# Patient Record
Sex: FEMALE | Race: WHITE | URBAN - METROPOLITAN AREA
[De-identification: names, ages, dates, MRNs, and addresses within clinical notes are randomized per-mention and may not be internally consistent; named-entity substitution may affect disease eponyms.]

---

## 2017-05-08 ENCOUNTER — TELEPHONE (OUTPATIENT)
Dept: CARDIOLOGY CLINIC | Age: 82
End: 2017-05-08

## 2017-05-08 DIAGNOSIS — I42.9 SECONDARY CARDIOMYOPATHY, UNSPECIFIED: Primary | ICD-10-CM

## 2017-05-23 ENCOUNTER — OFFICE VISIT (OUTPATIENT)
Dept: CARDIOLOGY CLINIC | Age: 82
End: 2017-05-23

## 2017-05-23 VITALS
DIASTOLIC BLOOD PRESSURE: 62 MMHG | WEIGHT: 122 LBS | BODY MASS INDEX: 20.33 KG/M2 | HEIGHT: 65 IN | SYSTOLIC BLOOD PRESSURE: 122 MMHG | HEART RATE: 60 BPM

## 2017-05-23 DIAGNOSIS — I25.10 ATHEROSCLEROSIS OF NATIVE CORONARY ARTERY OF NATIVE HEART WITHOUT ANGINA PECTORIS: ICD-10-CM

## 2017-05-23 DIAGNOSIS — I10 ESSENTIAL HYPERTENSION, BENIGN: ICD-10-CM

## 2017-05-23 DIAGNOSIS — I42.9 CARDIOMYOPATHY (HCC): ICD-10-CM

## 2017-05-23 DIAGNOSIS — I48.0 PAROXYSMAL ATRIAL FIBRILLATION (HCC): ICD-10-CM

## 2017-05-23 DIAGNOSIS — I35.1 AORTIC VALVE INSUFFICIENCY, UNSPECIFIED ETIOLOGY: Primary | ICD-10-CM

## 2017-05-23 DIAGNOSIS — E78.5 HYPERLIPIDEMIA, UNSPECIFIED HYPERLIPIDEMIA TYPE: ICD-10-CM

## 2017-05-23 PROCEDURE — 99214 OFFICE O/P EST MOD 30 MIN: CPT | Performed by: INTERNAL MEDICINE

## 2017-05-23 RX ORDER — TORSEMIDE 20 MG/1
10 TABLET ORAL DAILY
Qty: 30 TABLET | Refills: 6 | Status: SHIPPED | OUTPATIENT
Start: 2017-05-23 | End: 2018-04-19 | Stop reason: SDUPTHER

## 2017-05-23 RX ORDER — WARFARIN SODIUM 2 MG/1
TABLET ORAL
COMMUNITY
Start: 2017-03-02 | End: 2019-04-26 | Stop reason: SDUPTHER

## 2017-07-21 ENCOUNTER — INPATIENT (INPATIENT)
Facility: HOSPITAL | Age: 82
LOS: 4 days | Discharge: NURSING, REHAB OR LONG-TERM CARE | End: 2017-07-26
Payer: COMMERCIAL

## 2017-07-21 PROCEDURE — 73502 X-RAY EXAM HIP UNI 2-3 VIEWS: CPT | Mod: 26,RT

## 2017-07-21 PROCEDURE — 72170 X-RAY EXAM OF PELVIS: CPT | Mod: 26

## 2017-07-21 PROCEDURE — 99284 EMERGENCY DEPT VISIT MOD MDM: CPT

## 2017-07-21 PROCEDURE — 72192 CT PELVIS W/O DYE: CPT | Mod: 26

## 2017-07-23 PROCEDURE — 73130 X-RAY EXAM OF HAND: CPT | Mod: 26,LT

## 2017-10-25 ENCOUNTER — OFFICE VISIT (OUTPATIENT)
Dept: CARDIOLOGY CLINIC | Age: 82
End: 2017-10-25

## 2017-10-25 VITALS
SYSTOLIC BLOOD PRESSURE: 136 MMHG | DIASTOLIC BLOOD PRESSURE: 80 MMHG | HEART RATE: 60 BPM | HEIGHT: 65 IN | WEIGHT: 123.8 LBS | BODY MASS INDEX: 20.62 KG/M2

## 2017-10-25 DIAGNOSIS — E78.5 HYPERLIPIDEMIA, UNSPECIFIED HYPERLIPIDEMIA TYPE: ICD-10-CM

## 2017-10-25 DIAGNOSIS — Z87.74 S/P VSD REPAIR: ICD-10-CM

## 2017-10-25 DIAGNOSIS — I25.5 ISCHEMIC CARDIOMYOPATHY: ICD-10-CM

## 2017-10-25 DIAGNOSIS — I48.0 PAROXYSMAL ATRIAL FIBRILLATION (HCC): ICD-10-CM

## 2017-10-25 DIAGNOSIS — I10 ESSENTIAL HYPERTENSION, BENIGN: Primary | ICD-10-CM

## 2017-10-25 DIAGNOSIS — I35.1 AORTIC VALVE INSUFFICIENCY, ETIOLOGY OF CARDIAC VALVE DISEASE UNSPECIFIED: ICD-10-CM

## 2017-10-25 PROCEDURE — 99213 OFFICE O/P EST LOW 20 MIN: CPT | Performed by: INTERNAL MEDICINE

## 2017-10-25 RX ORDER — WARFARIN SODIUM 2 MG/1
2 TABLET ORAL DAILY
COMMUNITY

## 2017-10-25 NOTE — PROGRESS NOTES
Cardiac Follow Up    Referring Provider:  Augustine Sutherland MD     Chief Complaint   Patient presents with    6 Month Follow-Up    Atrial Fibrillation    Coronary Artery Disease    Hypertension      History of Present Illness:  Ms. Arthur Muhammad is an 80 y.o. female here today in follow up. 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 In March 2007, she became very weak and short of breath. Repeat angiography revealed patent grafts. A stress test in October 2010 a revealed a fixed defect and normal ejection fraction. Previously had bleeding on xarelto, now on coumadin. Today, Johnny Mahmood tells me she is doing well. She denies chest pain, palpitations, STOKES, dizziness, or edema. No problem with bleeding. She has had a stable INR. She has no chest pain, shortness of breath palpitations or dizziness. Past Medical History:   has a past medical history of AF (atrial fibrillation) ([address] CAD (coronary artery disease); Hyperlipidemia; Hypertension; and Valvular heart disease. Surgical History:   has a past surgical history that includes Coronary artery bypass graft and Diagnostic Cardiac Cath Lab Procedure. Social History:   reports that she has quit smoking. She has never used smokeless tobacco. She reports that she drinks alcohol. She reports that she does not use drugs. Family History:  family history includes Cancer in her father and sister; Other in her mother.      Current Outpatient Prescriptions   Medication Sig Dispense Refill    warfarin (COUMADIN) 2 MG tablet Take 2 mg by mouth daily      torsemide (DEMADEX) 20 MG tablet Take 0.5 tablets by mouth daily (Patient taking differently: Take 20 mg by mouth daily ) 30 tablet 6    metoprolol (TOPROL XL) 25 MG XL tablet Take 1 tablet by mouth daily 30 tablet 3    irbesartan (AVAPRO) 150 MG tablet Take Appearance: Well-nourished, appears younger than her stated age, in no acute distress   Respiratory:  · Normal excursion and expansion without use of accessory muscles  · Resp Auscultation: Normal breath sounds without dullness  Cardiovascular:  · The apical impulses not displaced  · Heart tones are crisp and normal  · Cervical veins are not engorged  · The carotid upstroke is normal in amplitude and contour without delay or bruit  · Well-healed median sternotomy incision  · S4 gallop unchanged  · II/VI pansystolic murmur at the apex to the axilla unchanged  · Peripheral pulses are symmetrical and full  · There is no clubbing, cyanosis of the extremities. · No edema  · Femoral Arteries: 2+ and equal  · Pedal Pulses: 2+ and equal   · Significant vein presence. Abdomen:  · No masses or tenderness  · Liver/Spleen: No Abnormalities Noted  Neurological/Psychiatric:  · Alert and oriented in all spheres  · Moves all extremities well  · Exhibits normal gait balance and coordination  · No abnormalities of mood, affect, memory, mentation, or behavior are noted      Assessment:   Problem: Coronary artery disease and left ventricular dysfunction  October, 1988: Repair of ventricular septal rupture. Saphenous vein bypass graft to the intermediate and obtuse marginal branch and the posterolateral branch of the circumflex. November 2011: Adenosine Myoview study no evidence of myocardial ischemia. Fixed apical defect. Overall left ventricular function was within normal limits. Problem: CHF  Echo 5/2014: EF 45-50%  MUGA 12/2015: EF 47%  E[place] (in [place]) 3/2016: EF 50%, LAE, WALDEMAR, mild AI, mild-mod TR    Problem: Hypertension    /80 (Site: Left Arm, Position: Sitting, Cuff Size: Small Adult)   Pulse 60 Comment: irregular  Ht 5' 5\" (1.651 m)   Wt 123 lb 12.8 oz (56.2 kg)   BMI 20.60 kg/m²   Stable BP. Problem: Hyperlipidemia  10/13/16: ; TRIG 64; HDL 58; LDL 35  Favorable control on current statin.       Problem: Dyspnea on exertion    5/2014 ECHO EF 45-50% mild MVP, Mild to mod TR; mild to mod AI     11/2015 ECHO: EF 35-40%, severely dilated RA, LA and RV, mild-mod AR, trace MR, mild TR; recent  (no edema on her LE)  4/17-- EF 50%    Problem: renal insufficiency  Creat 1.28, we decreased demadex to 10 mg daily--instructed to call for shortness of breath    Problem: Atrial fibrillation  Remains on coumadin  Event monitor 5/2016: Min HR 37, Max , Avg 62 BPM when in Afib. 80% of the time in Afib. Many short, asymptomatic runs of NSVT. Also 2-2.5 second pauses    Plan:  Same medications. Follow up in     Guillaume Weeks M.D., [place]    NOTE:  This report was transcribed using voice recognition software. Every effort was made to ensure accuracy; however, inadvertent computerized transcription errors may be present.

## 2018-04-19 ENCOUNTER — OFFICE VISIT (OUTPATIENT)
Dept: CARDIOLOGY CLINIC | Age: 83
End: 2018-04-19

## 2018-04-19 ENCOUNTER — TELEPHONE (OUTPATIENT)
Dept: CARDIOLOGY CLINIC | Age: 83
End: 2018-04-19

## 2018-04-19 VITALS
HEIGHT: 65 IN | SYSTOLIC BLOOD PRESSURE: 130 MMHG | DIASTOLIC BLOOD PRESSURE: 64 MMHG | HEART RATE: 52 BPM | BODY MASS INDEX: 19.99 KG/M2 | WEIGHT: 120 LBS

## 2018-04-19 DIAGNOSIS — I25.10 ATHEROSCLEROSIS OF NATIVE CORONARY ARTERY OF NATIVE HEART WITHOUT ANGINA PECTORIS: ICD-10-CM

## 2018-04-19 DIAGNOSIS — I10 ESSENTIAL HYPERTENSION, BENIGN: ICD-10-CM

## 2018-04-19 DIAGNOSIS — E78.5 HYPERLIPIDEMIA, UNSPECIFIED HYPERLIPIDEMIA TYPE: ICD-10-CM

## 2018-04-19 DIAGNOSIS — I48.0 PAROXYSMAL ATRIAL FIBRILLATION (HCC): Primary | ICD-10-CM

## 2018-04-19 PROCEDURE — 93000 ELECTROCARDIOGRAM COMPLETE: CPT | Performed by: INTERNAL MEDICINE

## 2018-04-19 PROCEDURE — 99214 OFFICE O/P EST MOD 30 MIN: CPT | Performed by: INTERNAL MEDICINE

## 2018-04-19 RX ORDER — TORSEMIDE 20 MG/1
20 TABLET ORAL DAILY
Qty: 90 TABLET | Refills: 3 | Status: SHIPPED | OUTPATIENT
Start: 2018-04-19 | End: 2018-04-23 | Stop reason: SDUPTHER

## 2018-04-19 RX ORDER — MULTIVITAMIN WITH IRON
100 TABLET ORAL DAILY
COMMUNITY
End: 2019-04-26

## 2018-04-20 ENCOUNTER — TELEPHONE (OUTPATIENT)
Dept: CARDIOLOGY CLINIC | Age: 83
End: 2018-04-20

## 2018-04-20 DIAGNOSIS — E78.5 HYPERLIPIDEMIA, UNSPECIFIED HYPERLIPIDEMIA TYPE: Primary | ICD-10-CM

## 2018-04-20 DIAGNOSIS — I10 ESSENTIAL HYPERTENSION, BENIGN: ICD-10-CM

## 2018-04-20 LAB
ALBUMIN SERPL-MCNC: 3.8 G/DL (ref 3.5–5)
ALP BLD-CCNC: 131 IU/L (ref 35–104)
ALT SERPL-CCNC: 23 IU/L (ref 10–35)
ANION GAP SERPL CALCULATED.3IONS-SCNC: 15 MMOL/L (ref 6–18)
AST SERPL-CCNC: 129 IU/L (ref 10–35)
BILIRUB SERPL-MCNC: 0.8 MG/DL (ref 0–1)
BUN BLDV-MCNC: 33 MG/DL (ref 8–26)
CALCIUM SERPL-MCNC: 9.4 MG/DL (ref 8.8–10.1)
CHLORIDE BLD-SCNC: 103 MEQ/L (ref 101–111)
CHOLESTEROL, TOTAL: 149 MG/DL
CHOLESTEROL/HDL RATIO: 2.4 (ref 1.9–4.2)
CO2: 28 MEQ/L (ref 22–29)
CREAT SERPL-MCNC: 1.05 MG/DL (ref 0.44–1.03)
GFR AFRICAN AMERICAN: >60 ML/MIN/1.73 SQ METER
GFR NON-AFRICAN AMERICAN: 50 ML/MIN/1.73 SQ METER
GLUCOSE BLD-MCNC: 104 MG/DL (ref 70–99)
HDLC SERPL-MCNC: 63 MG/DL
LDL CHOLESTEROL CALCULATED: 71 MG/DL
LDL/HDL RATIO: 1.1 (ref 1–4)
POTASSIUM SERPL-SCNC: 3.8 MEQ/L (ref 3.6–5.1)
SODIUM BLD-SCNC: 142 MEQ/L (ref 135–145)
TOTAL PROTEIN: 7.3 G/DL (ref 6.6–8.7)
TRIGL SERPL-MCNC: 76 MG/DL

## 2018-04-23 RX ORDER — TORSEMIDE 10 MG/1
10 TABLET ORAL DAILY
Qty: 90 TABLET | Refills: 3
Start: 2018-04-23 | End: 2019-05-01 | Stop reason: SDUPTHER

## 2018-05-02 ENCOUNTER — TELEPHONE (OUTPATIENT)
Dept: CARDIOLOGY CLINIC | Age: 83
End: 2018-05-02

## 2018-05-16 ENCOUNTER — TELEPHONE (OUTPATIENT)
Dept: CARDIOLOGY CLINIC | Age: 83
End: 2018-05-16

## 2018-05-21 ENCOUNTER — TELEPHONE (OUTPATIENT)
Dept: CARDIOLOGY CLINIC | Age: 83
End: 2018-05-21

## 2018-05-24 ENCOUNTER — TELEPHONE (OUTPATIENT)
Dept: CARDIOLOGY CLINIC | Age: 83
End: 2018-05-24

## 2018-05-25 ENCOUNTER — TELEPHONE (OUTPATIENT)
Dept: CARDIOLOGY CLINIC | Age: 83
End: 2018-05-25

## 2018-05-25 ENCOUNTER — OFFICE VISIT (OUTPATIENT)
Dept: CARDIOLOGY CLINIC | Age: 83
End: 2018-05-25

## 2018-05-25 VITALS
WEIGHT: 121 LBS | DIASTOLIC BLOOD PRESSURE: 62 MMHG | BODY MASS INDEX: 20.16 KG/M2 | HEIGHT: 65 IN | HEART RATE: 54 BPM | SYSTOLIC BLOOD PRESSURE: 124 MMHG | OXYGEN SATURATION: 98 %

## 2018-05-25 DIAGNOSIS — I25.10 ATHEROSCLEROSIS OF NATIVE CORONARY ARTERY OF NATIVE HEART WITHOUT ANGINA PECTORIS: Primary | ICD-10-CM

## 2018-05-25 DIAGNOSIS — Z01.818 PREOPERATIVE CLEARANCE: ICD-10-CM

## 2018-05-25 PROCEDURE — 99214 OFFICE O/P EST MOD 30 MIN: CPT | Performed by: INTERNAL MEDICINE

## 2018-06-24 PROBLEM — Z01.818 PREOPERATIVE CLEARANCE: Status: RESOLVED | Noted: 2018-05-25 | Resolved: 2018-06-24

## 2018-10-05 ENCOUNTER — OFFICE VISIT (OUTPATIENT)
Dept: CARDIOLOGY CLINIC | Age: 83
End: 2018-10-05
Payer: MEDICARE

## 2018-10-05 VITALS
HEART RATE: 56 BPM | BODY MASS INDEX: 20.14 KG/M2 | HEIGHT: 65 IN | SYSTOLIC BLOOD PRESSURE: 130 MMHG | DIASTOLIC BLOOD PRESSURE: 72 MMHG

## 2018-10-05 DIAGNOSIS — R74.8 ELEVATED LIVER ENZYMES: ICD-10-CM

## 2018-10-05 DIAGNOSIS — Z87.74 S/P VSD REPAIR: ICD-10-CM

## 2018-10-05 DIAGNOSIS — R06.02 SOB (SHORTNESS OF BREATH): Primary | ICD-10-CM

## 2018-10-05 DIAGNOSIS — R60.0 LOCALIZED EDEMA: ICD-10-CM

## 2018-10-05 DIAGNOSIS — I10 ESSENTIAL HYPERTENSION, BENIGN: ICD-10-CM

## 2018-10-05 PROCEDURE — 93000 ELECTROCARDIOGRAM COMPLETE: CPT | Performed by: NURSE PRACTITIONER

## 2018-10-05 PROCEDURE — 99214 OFFICE O/P EST MOD 30 MIN: CPT | Performed by: NURSE PRACTITIONER

## 2018-10-05 RX ORDER — PETROLATUM,WHITE/LANOLIN
OINTMENT (GRAM) TOPICAL 2 TIMES DAILY
COMMUNITY
End: 2020-11-13

## 2018-10-05 NOTE — LETTER
PVC's, Dr Flores Number regulates, checks INR at home. 2. Atherosclerosis of native coronary artery of native heart without angina pectoris -stable  10/88: repair of ventricular septal rupture. CABG w/ SVG-intermediate and OM branch and the posterolateral branch of the Cx, LAD is occluded. 3. Essential hypertension, benign -stable   4. Hyperlipidemia, unspecified hyperlipidemia type -Lipitor 10mg daily, follows with pcp     5. EDEMA- check labs to see if could take exta 1/2 of demedex when swelling worse, compression hose  6. Elevated liver enzymes in the past   4/18  ,  ALT 23   ALk phosphatase 131      Check CMP and lipids. Encouraged to get regular exercise. Continue compression hose. Has FU and wants to keep OV with DR. Kay    Thank you for allowing to me to participate in the care of Yusuf Solis. If you have questions, please do not hesitate to call me. I look forward to following James Baldwin along with you.     Sincerely,        ANDREA Ramos Cha - CNP

## 2018-10-19 LAB
ALBUMIN SERPL-MCNC: 3.8 G/DL (ref 3.5–5)
ALP BLD-CCNC: 111 IU/L (ref 35–104)
ALT SERPL-CCNC: 13 IU/L (ref 10–35)
ANION GAP SERPL CALCULATED.3IONS-SCNC: 14 MMOL/L (ref 6–18)
AST SERPL-CCNC: 67 IU/L (ref 10–35)
B-TYPE NATRIURETIC PEPTIDE: 552 PG/ML (ref 0–95)
BILIRUB SERPL-MCNC: 1.2 MG/DL (ref 0–1)
BILIRUBIN DIRECT: 0.4 MG/DL (ref 0–0.2)
BUN BLDV-MCNC: 29 MG/DL (ref 8–26)
CALCIUM SERPL-MCNC: 10 MG/DL (ref 8.8–10.1)
CHLORIDE BLD-SCNC: 99 MEQ/L (ref 101–111)
CO2: 30 MMOL/L (ref 22–29)
CREAT SERPL-MCNC: 1.18 MG/DL (ref 0.44–1.03)
GFR AFRICAN AMERICAN: 47 ML/MIN/1.73 M2
GFR NON-AFRICAN AMERICAN: 40 ML/MIN/1.73 M2
GLUCOSE BLD-MCNC: 93 MG/DL (ref 70–99)
POTASSIUM SERPL-SCNC: 4 MEQ/L (ref 3.6–5.1)
SODIUM BLD-SCNC: 139 MEQ/L (ref 135–145)
TOTAL PROTEIN: 7.2 G/DL (ref 6.6–8.7)

## 2018-10-25 ENCOUNTER — TELEPHONE (OUTPATIENT)
Dept: CARDIOLOGY CLINIC | Age: 83
End: 2018-10-25

## 2018-10-25 ENCOUNTER — OFFICE VISIT (OUTPATIENT)
Dept: CARDIOLOGY CLINIC | Age: 83
End: 2018-10-25
Payer: MEDICARE

## 2018-10-25 VITALS
WEIGHT: 129 LBS | DIASTOLIC BLOOD PRESSURE: 70 MMHG | HEART RATE: 58 BPM | HEIGHT: 65 IN | BODY MASS INDEX: 21.49 KG/M2 | SYSTOLIC BLOOD PRESSURE: 140 MMHG

## 2018-10-25 DIAGNOSIS — I25.5 ISCHEMIC CARDIOMYOPATHY: ICD-10-CM

## 2018-10-25 DIAGNOSIS — I10 ESSENTIAL HYPERTENSION, BENIGN: ICD-10-CM

## 2018-10-25 DIAGNOSIS — E78.5 HYPERLIPIDEMIA, UNSPECIFIED HYPERLIPIDEMIA TYPE: ICD-10-CM

## 2018-10-25 DIAGNOSIS — I25.10 ATHEROSCLEROSIS OF NATIVE CORONARY ARTERY OF NATIVE HEART WITHOUT ANGINA PECTORIS: Primary | ICD-10-CM

## 2018-10-25 PROCEDURE — 99214 OFFICE O/P EST MOD 30 MIN: CPT | Performed by: INTERNAL MEDICINE

## 2018-10-25 NOTE — PROGRESS NOTES
[address]   Cardiac Evaluation      Patient: Dwain Larose  YOB: 1931  Date: 10/25/18       Chief Complaint   Patient presents with    Hypertension    Hyperlipidemia    Atrial Fibrillation        Referring provider: Avani Rapp MD    History of Present Illness:   Mrs Chriss Webb is seen today for follow up. Saw CRNP 10/5/18 for edema. History includes CAD w/ CABG and repair of ventricular septal aneurysm and rupture in 1988. She had repeat LHC in 2007 that revealed patent grafts. She is on coumadin for atrial fibrillation. Today, Mrs Chriss Webb states she has had edema in her LE's. She wears compression hose. Has varicose veins. Beba Acuna denies any chest pain, palpitations, dizziness. She is here with her  today. She does not exercise anymore because walking is difficult. Past Medical History:   has a past medical history of AF (atrial fibrillation) ([facility] [address] CAD (coronary artery disease); Hyperlipidemia; Hypertension; and Valvular heart disease. Surgical History:   has a past surgical history that includes Coronary artery bypass graft and Diagnostic Cardiac Cath Lab Procedure. Current Outpatient Prescriptions   Medication Sig Dispense Refill    Misc Natural Products (GLUCOS-CHONDROIT-MSM COMPLEX) TABS Take by mouth 2 times daily      torsemide (DEMADEX) 10 MG tablet Take 1 tablet by mouth daily 90 tablet 3    vitamin B-6 (PYRIDOXINE) 100 MG tablet Take 100 mg by mouth daily      warfarin (COUMADIN) 2 MG tablet Take 2 mg by mouth daily      COUMADIN 2 MG tablet       metoprolol (TOPROL XL) 25 MG XL tablet Take 1 tablet by mouth daily 30 tablet 3    irbesartan (AVAPRO) 150 MG tablet Take 1 tablet by mouth nightly 30 tablet 3    oxybutynin (DITROPAN) 5 MG tablet Take 5 mg by mouth daily.  co-enzyme Q-10 (COQ10) 30 MG capsule Take 200 mg by mouth daily       levothyroxine (LEVOTHROID) 75 MCG tablet Take 75 mcg by mouth daily.         vitamin B-12 1018   BP: (!) 140/70 (!) 140/70   Site: Left Upper Arm    Position: Sitting    Cuff Size: Medium Adult    Pulse: 58    Weight: 129 lb (58.5 kg)    Height: 5' 5\" (1.651 m)      Body mass index is 21.47 kg/m². Wt Readings from Last 3 Encounters:   10/25/18 129 lb (58.5 kg)   05/25/18 121 lb (54.9 kg)   04/19/18 120 lb (54.4 kg)      BP Readings from Last 3 Encounters:   10/25/18 (!) 140/70   10/04/18 130/72   05/25/18 124/62      Constitutional and General Appearance:  appears stated age, frail  Eyes - no xanthelasma  Respiratory:  · Normal excursion and expansion without use of accessory muscles  · Resp Auscultation: Normal breath sounds without dullness  Cardiovascular:  · The apical impulses not displaced  · Heart is irregular rate and rhythm with normal S1, S2   · PMI is normal  · The carotid upstroke is normal, no bruit noted   · JVP is not elevated  · Peripheral pulses are symmetrical  · There is no clubbing, cyanosis of the extremities  · No edema; severe varicose veins and hyperpigmentation of the lower legs  · Femoral Arteries: 2+ and equal without bruits  · Pedal Pulses: 2+ and equal   Abdomen:  · No masses or tenderness  · Aorta not palpable  · Normal bowel sounds  Neurological/Psychiatric:  · Alert and oriented x3  · Moves all extremities well  · Exhibits normal gait balance and coordination      Assessment/Plan  1. Paroxysmal atrial fibrillation -EKG> atrial fibrillation with CVR RBBB, PVC's, Dr Danny Franco regulates, checks INR at home. HR 90bpm after walking my hallway; she did not appear to have any respiratory distress with walking. 2. Atherosclerosis of native coronary artery of native heart without angina pectoris -stable  10/88: repair of ventricular septal rupture. CABG w/ SVG-intermediate and OM branch and the posterolateral branch of the Cx, LAD is occluded. 3. Essential hypertension, benign -stable   4.  Hyperlipidemia, unspecified hyperlipidemia type -Lipitor 10mg daily, follows with pcp

## 2018-12-05 ENCOUNTER — OFFICE VISIT (OUTPATIENT)
Dept: CARDIOLOGY CLINIC | Age: 83
End: 2018-12-05
Payer: MEDICARE

## 2018-12-05 VITALS
HEIGHT: 65 IN | SYSTOLIC BLOOD PRESSURE: 112 MMHG | WEIGHT: 133 LBS | BODY MASS INDEX: 22.16 KG/M2 | DIASTOLIC BLOOD PRESSURE: 60 MMHG | HEART RATE: 54 BPM

## 2018-12-05 DIAGNOSIS — E78.5 HYPERLIPIDEMIA, UNSPECIFIED HYPERLIPIDEMIA TYPE: ICD-10-CM

## 2018-12-05 DIAGNOSIS — I25.10 ATHEROSCLEROSIS OF NATIVE CORONARY ARTERY OF NATIVE HEART WITHOUT ANGINA PECTORIS: ICD-10-CM

## 2018-12-05 DIAGNOSIS — I10 ESSENTIAL HYPERTENSION, BENIGN: ICD-10-CM

## 2018-12-05 DIAGNOSIS — R60.0 LOCALIZED EDEMA: ICD-10-CM

## 2018-12-05 DIAGNOSIS — R06.02 SOB (SHORTNESS OF BREATH): Primary | ICD-10-CM

## 2018-12-05 PROCEDURE — 99214 OFFICE O/P EST MOD 30 MIN: CPT | Performed by: NURSE PRACTITIONER

## 2018-12-05 RX ORDER — SPIRONOLACTONE 25 MG/1
12.5 TABLET ORAL DAILY
Qty: 15 TABLET | Refills: 5 | Status: SHIPPED | OUTPATIENT
Start: 2018-12-05 | End: 2018-12-20 | Stop reason: SDUPTHER

## 2018-12-05 NOTE — PROGRESS NOTES
tablet by mouth daily 30 tablet 3    oxybutynin (DITROPAN) 5 MG tablet Take 5 mg by mouth daily.  co-enzyme Q-10 (COQ10) 30 MG capsule Take 200 mg by mouth daily       levothyroxine (LEVOTHROID) 75 MCG tablet Take 75 mcg by mouth daily.  vitamin B-12 (CYANOCOBALAMIN) 500 MCG tablet Take 1,000 mcg by mouth daily       folic acid (FOLVITE) 1 MG tablet Take 1 mg by mouth daily.  vitamin B-6 (PYRIDOXINE) 100 MG tablet Take 100 mg by mouth daily       No current facility-administered medications for this visit. Social History:  Social History     Social History    Marital status:      Spouse name: N/A    Number of children: N/A    Years of education: N/A     Occupational History    Not on file. Social History Main Topics    Smoking status: Former Smoker    Smokeless tobacco: Never Used      Comment: quit 20 years    Alcohol use Yes      Comment: minimal/1 glass of wine each night    Drug use: No    Sexual activity: Not on file     Other Topics Concern    Not on file     Social History Narrative    No narrative on file       Family History:  family history includes Cancer in her father and sister; Other in her mother. Allergies:  Levaquin [levofloxacin in d5w]     Review of Systems:   · Constitutional: there has been no unanticipated weight loss. No change in energy or activity level   · Eyes: No visual changes   · ENT: No Headaches, hearing loss or vertigo. No mouth sores or sore throat. · Cardiovascular: Reviewed in HPI  · Respiratory: No cough or wheezing, no sputum production. · Gastrointestinal: No abdominal pain, appetite loss, blood in stools. No change in bowel or bladder habits. · Genitourinary: No nocturia, dysuria, trouble voiding  · Musculoskeletal:  No gait disturbance, weakness or joint complaints. · Integumentary: No rash or pruritis. · Neurological: No headache, change in muscle strength, numbness or tingling.  No change in gait, balance,

## 2018-12-11 ENCOUNTER — PROCEDURE VISIT (OUTPATIENT)
Dept: CARDIOLOGY CLINIC | Age: 83
End: 2018-12-11

## 2018-12-11 DIAGNOSIS — R06.02 SOB (SHORTNESS OF BREATH): ICD-10-CM

## 2018-12-11 DIAGNOSIS — R60.0 LOCALIZED EDEMA: Primary | ICD-10-CM

## 2018-12-11 LAB
LV EF: 43 %
LVEF MODALITY: NORMAL

## 2018-12-12 ENCOUNTER — TELEPHONE (OUTPATIENT)
Dept: CARDIOLOGY CLINIC | Age: 83
End: 2018-12-12

## 2018-12-12 NOTE — TELEPHONE ENCOUNTER
Pt  calling would like a copy of the Echo done 12/11/18 at [facility]. Will  there in [facility] when ready.  Pls call to advise Thank you

## 2018-12-13 LAB
ANION GAP SERPL CALCULATED.3IONS-SCNC: 12 MMOL/L (ref 6–18)
B-TYPE NATRIURETIC PEPTIDE: 531 PG/ML (ref 0–95)
BUN BLDV-MCNC: 34 MG/DL (ref 8–26)
CALCIUM SERPL-MCNC: 9.1 MG/DL (ref 8.8–10.1)
CHLORIDE BLD-SCNC: 99 MEQ/L (ref 101–111)
CO2: 32 MMOL/L (ref 22–29)
CREAT SERPL-MCNC: 1.35 MG/DL (ref 0.44–1.03)
GFR AFRICAN AMERICAN: 40 ML/MIN/1.73 M2
GFR NON-AFRICAN AMERICAN: 34 ML/MIN/1.73 M2
GLUCOSE BLD-MCNC: 79 MG/DL (ref 70–99)
POTASSIUM SERPL-SCNC: 4.1 MEQ/L (ref 3.6–5.1)
SODIUM BLD-SCNC: 139 MEQ/L (ref 135–145)

## 2018-12-14 ENCOUNTER — TELEPHONE (OUTPATIENT)
Dept: CARDIOLOGY CLINIC | Age: 83
End: 2018-12-14

## 2018-12-20 ENCOUNTER — OFFICE VISIT (OUTPATIENT)
Dept: CARDIOLOGY CLINIC | Age: 83
End: 2018-12-20
Payer: MEDICARE

## 2018-12-20 VITALS
HEART RATE: 83 BPM | WEIGHT: 122 LBS | HEIGHT: 65 IN | SYSTOLIC BLOOD PRESSURE: 118 MMHG | DIASTOLIC BLOOD PRESSURE: 64 MMHG | BODY MASS INDEX: 20.33 KG/M2 | OXYGEN SATURATION: 93 %

## 2018-12-20 DIAGNOSIS — I48.0 PAROXYSMAL ATRIAL FIBRILLATION (HCC): ICD-10-CM

## 2018-12-20 DIAGNOSIS — I10 ESSENTIAL HYPERTENSION, BENIGN: Primary | ICD-10-CM

## 2018-12-20 DIAGNOSIS — R60.0 LOCALIZED EDEMA: ICD-10-CM

## 2018-12-20 DIAGNOSIS — I25.5 ISCHEMIC CARDIOMYOPATHY: ICD-10-CM

## 2018-12-20 DIAGNOSIS — E78.49 OTHER HYPERLIPIDEMIA: ICD-10-CM

## 2018-12-20 PROCEDURE — 99214 OFFICE O/P EST MOD 30 MIN: CPT | Performed by: INTERNAL MEDICINE

## 2018-12-20 RX ORDER — SPIRONOLACTONE 25 MG/1
12.5 TABLET ORAL DAILY
Qty: 45 TABLET | Refills: 3 | Status: SHIPPED | OUTPATIENT
Start: 2018-12-20 | End: 2019-10-04 | Stop reason: SDUPTHER

## 2018-12-20 NOTE — PROGRESS NOTES
tablet by mouth daily 30 tablet 3    oxybutynin (DITROPAN) 5 MG tablet Take 5 mg by mouth daily.  co-enzyme Q-10 (COQ10) 30 MG capsule Take 200 mg by mouth daily       levothyroxine (LEVOTHROID) 75 MCG tablet Take 75 mcg by mouth daily.  vitamin B-12 (CYANOCOBALAMIN) 500 MCG tablet Take 1,000 mcg by mouth daily       folic acid (FOLVITE) 1 MG tablet Take 1 mg by mouth daily. No current facility-administered medications for this visit. Social History:  Social History     Social History    Marital status:      Spouse name: N/A    Number of children: N/A    Years of education: N/A     Occupational History    Not on file. Social History Main Topics    Smoking status: Former Smoker    Smokeless tobacco: Never Used      Comment: quit 20 years    Alcohol use Yes      Comment: minimal/1 glass of wine each night    Drug use: No    Sexual activity: Not on file     Other Topics Concern    Not on file     Social History Narrative    No narrative on file       Family History:  family history includes Cancer in her father and sister; Other in her mother. Allergies:  Levaquin [levofloxacin in d5w]     Review of Systems:   · Constitutional: there has been no unanticipated weight loss. No change in energy or activity level   · Eyes: No visual changes   · ENT: No Headaches, hearing loss or vertigo. No mouth sores or sore throat. · Cardiovascular: Reviewed in HPI  · Respiratory: No cough or wheezing, no sputum production. · Gastrointestinal: No abdominal pain, appetite loss, blood in stools. No change in bowel or bladder habits. · Genitourinary: No nocturia, dysuria, trouble voiding  · Musculoskeletal:  No gait disturbance, weakness or joint complaints. · Integumentary: No rash or pruritis. · Neurological: No headache, change in muscle strength, numbness or tingling. No change in gait, balance, coordination, mood, affect, memory, mentation, behavior.   · Psychiatric: No -stable  Echo 12/11/18: Mild cLVH. Mildly decreased left ventricular systolic function with distal inferoseptal and apical akinesis. No evidence of clot or shunt. Hx of ventricular septum aneurysm and rupture repair. EF 40-45%. Mitral valve sclerosis with mild posterior mitral valve prolapse. Moderate mitral regurgitation with no pulmonary vein reversal. The left atrium is dilated. The aortic valve appears tricuspid with mild sclerosis no stenosis. Moderate aortic regurgitation is present. Moderate-severe tricuspid regurgitation. RVSP 45mmHg. The right atrium is severely dilated. Inferior vena cava appears dilated. 10/88: repair of ventricular septal rupture. CABG w/ SVG-intermediate and OM branch and the posterolateral branch of the Cx, LAD is occluded. 3. Essential hypertension, benign -stable, no longer taking Avapro, was stopped when she was in the hospital for hip surgery for presumed hypotension. With complaints of dizziness in the past and borderline BP I will not restart at this time. 4. Hyperlipidemia, unspecified hyperlipidemia type  Not taking Lipitor 10mg daily, follows with pcp at this time, discontinued due to elevated liver enzymes   5. Edema - from varicose veins, advised to continue to wear compression hose, low Na diet, and limit water consumption  6. Elevated liver enzymes in the past, she can drink 1 glass of wine per night  10/18: AST 67; ALT 13; Alk phophatase 111  4/18:  ,  ALT 23, Alk phosphatase 131      PLAN: Echo findings discussed at length. She will need to have BMP checked in [place]. FU when they return from [facility] attestation: This note was scribed in the presence of Dr Shantal Jeronimo MD by Cranston Babinski, RN. The scribe's documentation has been prepared under my direction and personally reviewed by me in its entirety. I confirm that the note above accurately reflects all work, treatment, procedures, and medical decision making performed by me.

## 2019-04-10 DIAGNOSIS — I25.10 ATHEROSCLEROSIS OF NATIVE CORONARY ARTERY OF NATIVE HEART WITHOUT ANGINA PECTORIS: Primary | ICD-10-CM

## 2019-04-26 ENCOUNTER — OFFICE VISIT (OUTPATIENT)
Dept: CARDIOLOGY CLINIC | Age: 84
End: 2019-04-26
Payer: MEDICARE

## 2019-04-26 VITALS
SYSTOLIC BLOOD PRESSURE: 128 MMHG | DIASTOLIC BLOOD PRESSURE: 62 MMHG | BODY MASS INDEX: 19.83 KG/M2 | HEIGHT: 65 IN | WEIGHT: 119 LBS | HEART RATE: 60 BPM

## 2019-04-26 DIAGNOSIS — E78.5 HYPERLIPIDEMIA, UNSPECIFIED HYPERLIPIDEMIA TYPE: ICD-10-CM

## 2019-04-26 DIAGNOSIS — R60.0 LOCALIZED EDEMA: ICD-10-CM

## 2019-04-26 DIAGNOSIS — I25.10 ATHEROSCLEROSIS OF NATIVE CORONARY ARTERY OF NATIVE HEART WITHOUT ANGINA PECTORIS: Primary | ICD-10-CM

## 2019-04-26 DIAGNOSIS — I10 ESSENTIAL HYPERTENSION, BENIGN: ICD-10-CM

## 2019-04-26 DIAGNOSIS — I48.0 PAROXYSMAL ATRIAL FIBRILLATION (HCC): ICD-10-CM

## 2019-04-26 PROCEDURE — 99214 OFFICE O/P EST MOD 30 MIN: CPT | Performed by: INTERNAL MEDICINE

## 2019-04-26 RX ORDER — LOSARTAN POTASSIUM 25 MG/1
25 TABLET ORAL DAILY
Qty: 90 TABLET | Refills: 3 | Status: SHIPPED | OUTPATIENT
Start: 2019-04-26 | End: 2019-06-14

## 2019-04-26 RX ORDER — ACETAMINOPHEN 325 MG/1
650 TABLET ORAL EVERY 6 HOURS PRN
COMMUNITY
End: 2020-11-13

## 2019-04-26 NOTE — PATIENT INSTRUCTIONS
Start Losartan 25 mg daily-if feeling dizzy and lightheaded with low BP can stop  Continue other medications  Labs in 2 weeks and in 4-6 weeks  Follow up with Dr Princess Green in [facility]

## 2019-04-26 NOTE — PROGRESS NOTES
[address]  Cardiac Consult     Referring Provider:  Darinel Gonzales MD     Chief Complaint   Patient presents with    6 Month Follow-Up     Pt states she has no new cardiac complaints.  Coronary Artery Disease    Hyperlipidemia    Hypertension    Cardiomyopathy        History of Present Illness:   80 y.o. female followed by Dr. Gloria Knight who is seen in f/u after going to the wrong office. She has a h/o CAD s/p CABG with repair of ventricular septal aneurysm and rupture in 1988. Repeat cath 2007 with patent grafts. She is also on coumadin for atrial fib. She is doing well. She was started on aldactone 12/2018 due to edema with resolution. For some reason, she was no longer taking her ARB at that time. She does not know why. Possibly recalled. She denies dyspnea, chest pain, or edema. Her daughter who is a PCP in another state is here visiting. She is asking about why she is not on an ARB. She apparently had a cough with ACE    Past Medical History:   has a past medical history of AF (atrial fibrillation) ([address] CAD (coronary artery disease), Hyperlipidemia, Hypertension, and Valvular heart disease. Surgical History:   has a past surgical history that includes Coronary artery bypass graft and Diagnostic Cardiac Cath Lab Procedure. Social History:  Social History     Tobacco Use    Smoking status: Former Smoker    Smokeless tobacco: Never Used    Tobacco comment: quit 20 years   Substance Use Topics    Alcohol use: Yes     Comment: minimal/1 glass of wine each night        Family History:  family history includes Cancer in her father and sister; Other in her mother. Allergies:  Levaquin [levofloxacin in d5w]     Home Medications:  Prior to Visit Medications    Medication Sig Taking?  Authorizing Provider   acetaminophen (TYLENOL) 325 MG tablet Take 650 mg by mouth every 6 hours as needed for Pain Yes Historical Provider, MD   spironolactone (ALDACTONE) 25 MG tablet Take 0.5 non-tender, No hepatomegaly     ECHO 12/2018  -Mild concentric left ventricular hypertrophy.   -Mildly decreased left ventricular systolic function with distal   inferoseptal and apical akinesis. No evidence of clot or shunt. Hx of   ventricular septum aneurysm and rupture repair. EStimated EF 40-45%.  -E/e'=10.   -Mitral valve sclerosis with mild posterior mitral valve prolapse. Moderate   mitral regurgitation with no pulmonary vein reversal.   -The left atrium is dilated.   -The aortic valve appears tricuspid with mild sclerosis no stenosis.   -Moderate aortic regurgitation is present.   -Moderate-severe tricuspid regurgitation. RVSP 45mmHg.   -The right atrium is severely dilated.   Inferior vena cava appears dilated    Assessment:     Paroxysmal Atrial Fib:  On toprol for rate control and coumadin followed by Dr. Kain Mathews    CAD:  S/p CABG and septal rupture repair 1988. Cath 2007 patent grafts. Mild LV dysfunction on recent ECHO with EF=40-45%  With mild LV dysfunction will start cozaar 25 mg ( has no issues getting)  Will need to follow labs-Chem in 10 days and 6 weeks after  F/u Dr. Cody Rain in 3 months      HTN:  /62 (Site: Left Upper Arm, Position: Sitting, Cuff Size: Small Adult)   Pulse 60   Ht 5' 5\" (1.651 m)   Wt 119 lb (54 kg)   BMI 19.80 kg/m²   Controlled on current meds    Hyperlipidemia:  LDL=69  Good control. Not taking statin due to LFT elevation    AI/MR:  Moderate.   Follow    Plan:  Start Cozaar 25  Follow labs closely as is on low dos aldactone    Thank you for allowing me to participate in the care of this individual.      Dharmesh Armstrong M.D., [facility]

## 2019-05-01 RX ORDER — TORSEMIDE 10 MG/1
10 TABLET ORAL DAILY
Qty: 90 TABLET | Refills: 3 | Status: SHIPPED | OUTPATIENT
Start: 2019-05-01 | End: 2019-06-14

## 2019-05-22 ENCOUNTER — TELEPHONE (OUTPATIENT)
Dept: CARDIOLOGY CLINIC | Age: 84
End: 2019-05-22

## 2019-05-22 NOTE — TELEPHONE ENCOUNTER
SHAW SOUZA. ADRIANNAK reviewed labs. Bun 40, pt may be a little dehydrated. Have her decrease Torsemide to every other day. Recheck labs a few days prior to [facility] appt on 6/14. Patient was already given a second order at last OV to do this. LMOM for patient to call back to review instructions. If no return call please call again.

## 2019-06-14 ENCOUNTER — OFFICE VISIT (OUTPATIENT)
Dept: CARDIOLOGY CLINIC | Age: 84
End: 2019-06-14
Payer: MEDICARE

## 2019-06-14 VITALS
SYSTOLIC BLOOD PRESSURE: 118 MMHG | HEIGHT: 65 IN | DIASTOLIC BLOOD PRESSURE: 62 MMHG | BODY MASS INDEX: 19.66 KG/M2 | HEART RATE: 60 BPM | OXYGEN SATURATION: 99 % | WEIGHT: 118 LBS

## 2019-06-14 DIAGNOSIS — I10 ESSENTIAL HYPERTENSION, BENIGN: ICD-10-CM

## 2019-06-14 DIAGNOSIS — E78.5 HYPERLIPIDEMIA, UNSPECIFIED HYPERLIPIDEMIA TYPE: ICD-10-CM

## 2019-06-14 DIAGNOSIS — I25.10 ATHEROSCLEROSIS OF NATIVE CORONARY ARTERY OF NATIVE HEART WITHOUT ANGINA PECTORIS: Primary | ICD-10-CM

## 2019-06-14 DIAGNOSIS — I48.0 PAROXYSMAL ATRIAL FIBRILLATION (HCC): ICD-10-CM

## 2019-06-14 LAB
ANION GAP SERPL CALCULATED.3IONS-SCNC: 12 MMOL/L (ref 6–18)
BUN BLDV-MCNC: 25 MG/DL (ref 8–26)
CALCIUM SERPL-MCNC: 10 MG/DL (ref 8.8–10.1)
CHLORIDE BLD-SCNC: 102 MEQ/L (ref 101–111)
CO2: 29 MMOL/L (ref 22–29)
CREAT SERPL-MCNC: 1.05 MG/DL (ref 0.44–1.03)
GFR AFRICAN AMERICAN: 54 ML/MIN/1.73 M2
GFR NON-AFRICAN AMERICAN: 47 ML/MIN/1.73 M2
GLUCOSE BLD-MCNC: 71 MG/DL (ref 70–99)
POTASSIUM SERPL-SCNC: 4.9 MEQ/L (ref 3.6–5.1)
SODIUM BLD-SCNC: 138 MEQ/L (ref 135–145)

## 2019-06-14 PROCEDURE — 99214 OFFICE O/P EST MOD 30 MIN: CPT | Performed by: INTERNAL MEDICINE

## 2019-06-14 RX ORDER — LOSARTAN POTASSIUM 25 MG/1
25 TABLET ORAL DAILY
Qty: 90 TABLET | Refills: 3
Start: 2019-06-14 | End: 2020-07-27

## 2019-06-14 RX ORDER — TORSEMIDE 10 MG/1
10 TABLET ORAL DAILY
Qty: 30 TABLET | Refills: 6
Start: 2019-06-14 | End: 2020-07-30

## 2019-06-14 NOTE — PROGRESS NOTES
[address]   Cardiac Evaluation      Patient: Frantz Nagy  YOB: 1931  Date: 6/14/19       Chief Complaint   Patient presents with    Hyperlipidemia    Hypertension    Cardiomyopathy        Referring provider: Shona King MD    History of Present Illness:   Mrs Launie Mohs is seen today for follow up to echo and medication changes. History includes CAD w/ CABG and repair of ventricular septal aneurysm and rupture in 1988. She had repeat LHC in 2007 that revealed patent grafts. She is on coumadin for atrial fibrillation. She has a known cmp with moderate valvular disease and RVSP of 45. She has had complaints of dry mouth and dizziness in the past. She has had poor exercise tolerance with dyspnea on exertion but this is now improved and she walks in a park 3 times weekly. Today, Mrs Launie Mohs is here with her . She previously saw Dr Jeremiah Bass who started Cozaar. She then had BMP and advised to cut Torsemide to every other day. She states, today, neck pain is her main complaint. She is receiving cortisone injections. . She denies any chest pain, palpitations, STOKES, dizziness, or edema. Past Medical History:   has a past medical history of AF (atrial fibrillation) ([address] CAD (coronary artery disease), Hyperlipidemia, Hypertension, and Valvular heart disease. Surgical History:   has a past surgical history that includes Coronary artery bypass graft and Diagnostic Cardiac Cath Lab Procedure.      Current Outpatient Medications   Medication Sig Dispense Refill    torsemide (DEMADEX) 10 MG tablet Take 1 tablet by mouth daily (Patient taking differently: Take 10 mg by mouth daily 1/2 daily.) 90 tablet 3    losartan (COZAAR) 25 MG tablet Take 1 tablet by mouth daily 90 tablet 3    spironolactone (ALDACTONE) 25 MG tablet Take 0.5 tablets by mouth daily 45 tablet 3    warfarin (COUMADIN) 2 MG tablet Take 2 mg by mouth daily      metoprolol (TOPROL XL) 25 MG XL tablet Take 1 tablet by mouth daily 30 tablet 3    levothyroxine (LEVOTHROID) 75 MCG tablet Take 75 mcg by mouth daily.  acetaminophen (TYLENOL) 325 MG tablet Take 650 mg by mouth every 6 hours as needed for Pain      Misc Natural Products (GLUCOS-CHONDROIT-MSM COMPLEX) TABS Take by mouth 2 times daily      oxybutynin (DITROPAN) 5 MG tablet Take 5 mg by mouth daily.  co-enzyme Q-10 (COQ10) 30 MG capsule Take 100 mg by mouth daily       vitamin B-12 (CYANOCOBALAMIN) 500 MCG tablet Take 1,000 mcg by mouth daily       folic acid (FOLVITE) 1 MG tablet Take 400 mcg by mouth daily        No current facility-administered medications for this visit.         Social History:  Social History     Socioeconomic History    Marital status:      Spouse name: Not on file    Number of children: Not on file    Years of education: Not on file    Highest education level: Not on file   Occupational History    Not on file   Social Needs    Financial resource strain: Not on file    Food insecurity:     Worry: Not on file     Inability: Not on file    Transportation needs:     Medical: Not on file     Non-medical: Not on file   Tobacco Use    Smoking status: Former Smoker    Smokeless tobacco: Never Used    Tobacco comment: quit 20 years   Substance and Sexual Activity    Alcohol use: Yes     Comment: minimal/1 glass of wine each night    Drug use: No    Sexual activity: Not on file   Lifestyle    Physical activity:     Days per week: Not on file     Minutes per session: Not on file    Stress: Not on file   Relationships    Social connections:     Talks on phone: Not on file     Gets together: Not on file     Attends Pent[employer]al service: Not on file     Active member of club or organization: Not on file     Attends meetings of clubs or organizations: Not on file     Relationship status: Not on file    Intimate partner violence:     Fear of current or ex partner: Not on file     Emotionally abused: Not on file Physically abused: Not on file     Forced sexual activity: Not on file   Other Topics Concern    Not on file   Social History Narrative    Not on file       Family History:  family history includes Cancer in her father and sister; Other in her mother. Allergies:  Levaquin [levofloxacin in d5w]     Review of Systems:   · Constitutional: there has been no unanticipated weight loss. No change in energy or activity level   · Eyes: No visual changes   · ENT: No Headaches, hearing loss or vertigo. No mouth sores or sore throat. · Cardiovascular: Reviewed in HPI  · Respiratory: No cough or wheezing, no sputum production. · Gastrointestinal: No abdominal pain, appetite loss, blood in stools. No change in bowel or bladder habits. · Genitourinary: No nocturia, dysuria, trouble voiding  · Musculoskeletal:  No gait disturbance, weakness +neck pain  · Integumentary: No rash or pruritis. · Neurological: No headache, change in muscle strength, numbness or tingling. No change in gait, balance, coordination, mood, affect, memory, mentation, behavior. · Psychiatric: No anxiety or depression  · Endocrine: No malaise or fever  · Hematologic/Lymphatic: No abnormal bruising or bleeding, blood clots or swollen lymph nodes. · Allergic/Immunologic: No nasal congestion or hives. Physical Examination:    Vitals:    06/14/19 0912   BP: 118/62   Site: Left Upper Arm   Position: Sitting   Cuff Size: Medium Adult   Pulse: 60   SpO2: 99%   Weight: 118 lb (53.5 kg)   Height: 5' 5\" (1.651 m)     Body mass index is 19.64 kg/m².      Wt Readings from Last 3 Encounters:   06/14/19 118 lb (53.5 kg)   04/26/19 119 lb (54 kg)   12/20/18 122 lb (55.3 kg)      BP Readings from Last 3 Encounters:   06/14/19 118/62   04/26/19 128/62   12/20/18 118/64      Constitutional and General Appearance:  appears stated age, frail  Eyes - no xanthelasma  Respiratory:  · Normal excursion and expansion without use of accessory muscles  · Resp Auscultation: Normal breath sounds without dullness  Cardiovascular:  · The apical impulses not displaced  · Heart is irregular in rhythm with normal S1, S2   · PMI is normal  · The carotid upstroke is normal, no bruit noted   · JVP is not elevated  · Peripheral pulses are symmetrical  · There is no clubbing, cyanosis of the extremities  · No edema today, severe varicose veins and hyperpigmentation of the lower legs; she states she usually wears compression stockings. · Femoral Arteries: 2+ and equal without bruits  · Pedal Pulses: 2+ and equal   Abdomen:  · No masses or tenderness  · Aorta not palpable  · Normal bowel sounds  Neurological/Psychiatric:  · Alert and oriented x3  · Moves all extremities well  · Exhibits normal gait balance and coordination      Assessment/Plan  1. Paroxysmal atrial fibrillation, now seems to be chronic with controlled ventricular response-on low dose toprol. Dr Spencer Mittal regulates coumadin, checks INR at home   2. Atherosclerosis of native coronary artery of native heart without angina pectoris -stable  Echo 12/11/18: Mild cLVH. Mildly decreased left ventricular systolic function with distal inferoseptal and apical akinesis. No evidence of clot or shunt. Hx of ventricular septum aneurysm and rupture repair. EF 40-45%. Mitral valve sclerosis with mild posterior mitral valve prolapse. Moderate mitral regurgitation with no pulmonary vein reversal. The left atrium is dilated. The aortic valve appears tricuspid with mild sclerosis no stenosis. Moderate aortic regurgitation is present. Moderate-severe tricuspid regurgitation. RVSP 45mmHg. The right atrium is severely dilated. Inferior vena cava appears dilated  10/88: repair of ventricular septal rupture. CABG w/ SVG-intermediate and OM branch and the posterolateral branch of the Cx, LAD is occluded.     3. Essential hypertension, benign -stable, no longer taking Avapro, was stopped when she was in the hospital for hip surgery for presumed hypotension. With complaints of dizziness in the past and controlled blood pressure   4. Hyperlipidemia, unspecified hyperlipidemia type  Not taking Lipitor 10mg daily, follows with pcp at this time, discontinued due to elevated liver enzymes   5. Edema - from varicose veins, advised to continue to wear compression hose, low Na diet, and limit water consumption  6. Elevated liver enzymes in the past, she can drink 1 glass of wine per night  10/18: AST 67; ALT 13; Alk phophatase 111  4/18:  ,  ALT 23, Alk phosphatase 131      PLAN:  Check BMP; if she still has severe prerenal azotemia would consider decreasing torsemide again. Continue meds as they are. FU October. Scribe's attestation: This note was scribed in the presence of Dr Trung Saul MD by Jey Underwood RN. The scribe's documentation has been prepared under my direction and personally reviewed by me in its entirety. I confirm that the note above accurately reflects all work, treatment, procedures, and medical decision making performed by me.

## 2019-10-04 ENCOUNTER — OFFICE VISIT (OUTPATIENT)
Dept: CARDIOLOGY CLINIC | Age: 84
End: 2019-10-04
Payer: MEDICARE

## 2019-10-04 VITALS
HEIGHT: 65 IN | SYSTOLIC BLOOD PRESSURE: 128 MMHG | WEIGHT: 120 LBS | BODY MASS INDEX: 19.99 KG/M2 | DIASTOLIC BLOOD PRESSURE: 62 MMHG | HEART RATE: 56 BPM | OXYGEN SATURATION: 95 %

## 2019-10-04 DIAGNOSIS — I50.42 SYSTOLIC AND DIASTOLIC CHF, CHRONIC (HCC): Primary | ICD-10-CM

## 2019-10-04 DIAGNOSIS — I48.0 PAROXYSMAL ATRIAL FIBRILLATION (HCC): ICD-10-CM

## 2019-10-04 DIAGNOSIS — I10 ESSENTIAL HYPERTENSION, BENIGN: ICD-10-CM

## 2019-10-04 DIAGNOSIS — I34.1 MITRAL VALVE PROLAPSE: ICD-10-CM

## 2019-10-04 DIAGNOSIS — E78.5 HYPERLIPIDEMIA, UNSPECIFIED HYPERLIPIDEMIA TYPE: ICD-10-CM

## 2019-10-04 DIAGNOSIS — I25.10 ATHEROSCLEROSIS OF NATIVE CORONARY ARTERY OF NATIVE HEART WITHOUT ANGINA PECTORIS: ICD-10-CM

## 2019-10-04 PROCEDURE — 99214 OFFICE O/P EST MOD 30 MIN: CPT | Performed by: INTERNAL MEDICINE

## 2019-10-04 RX ORDER — SPIRONOLACTONE 25 MG/1
12.5 TABLET ORAL DAILY
Qty: 45 TABLET | Refills: 3 | Status: SHIPPED | OUTPATIENT
Start: 2019-10-04 | End: 2020-06-26

## 2020-04-23 ENCOUNTER — TELEPHONE (OUTPATIENT)
Dept: CARDIOLOGY CLINIC | Age: 85
End: 2020-04-23

## 2020-05-11 ENCOUNTER — TELEPHONE (OUTPATIENT)
Dept: CARDIOLOGY CLINIC | Age: 85
End: 2020-05-11

## 2020-06-23 ENCOUNTER — TELEPHONE (OUTPATIENT)
Dept: CARDIOLOGY CLINIC | Age: 85
End: 2020-06-23

## 2020-06-24 NOTE — TELEPHONE ENCOUNTER
She does need labs. Orders placed for CMP and lipids. Please call pt and fax orders to [facility].

## 2020-06-26 ENCOUNTER — OFFICE VISIT (OUTPATIENT)
Dept: CARDIOLOGY CLINIC | Age: 85
End: 2020-06-26
Payer: MEDICARE

## 2020-06-26 VITALS
DIASTOLIC BLOOD PRESSURE: 46 MMHG | HEART RATE: 57 BPM | OXYGEN SATURATION: 95 % | HEIGHT: 65 IN | BODY MASS INDEX: 20.33 KG/M2 | WEIGHT: 122 LBS | SYSTOLIC BLOOD PRESSURE: 94 MMHG

## 2020-06-26 PROCEDURE — 99214 OFFICE O/P EST MOD 30 MIN: CPT | Performed by: INTERNAL MEDICINE

## 2020-06-26 RX ORDER — METOPROLOL SUCCINATE 25 MG/1
12.5 TABLET, EXTENDED RELEASE ORAL DAILY
Qty: 30 TABLET | Refills: 6
Start: 2020-06-26 | End: 2021-10-13

## 2020-06-26 NOTE — PROGRESS NOTES
[address]   Cardiac Evaluation      Patient: Denia Hernandez  YOB: 1931  Date: 6/26/20       Chief Complaint   Patient presents with    Coronary Artery Disease    Hypertension    Hyperlipidemia        Referring provider: Berenice De Anda MD    History of Present Illness:   Mrs Reji Briggs is seen today for follow up. History includes CAD w/ CABG and repair of ventricular septal aneurysm and rupture in 1988. She had repeat LHC in 2007 that revealed patent grafts. She is on coumadin for atrial fibrillation. She has a known cmp with moderate valvular disease and RVSP of 45. She has had complaints of dry mouth and dizziness in the past.      Today, Mrs Reji Briggs is here with her . She states she is fatigued. She denies any chest pain, palpitations, dizziness. She does not drink as much fluid as she used to. She has shortness of breath with walking. WE walked in the parking low and her sats remain normal. Ozzy Sebastian and her  are going to [place] mid July. They just returned from [place] where they spent 5 months. Past Medical History:   has a past medical history of AF (atrial fibrillation) ([address] CAD (coronary artery disease), Hyperlipidemia, Hypertension, and Valvular heart disease. Surgical History:   has a past surgical history that includes Coronary artery bypass graft and Diagnostic Cardiac Cath Lab Procedure.      Current Outpatient Medications   Medication Sig Dispense Refill    spironolactone (ALDACTONE) 25 MG tablet Take 0.5 tablets by mouth daily 45 tablet 3    torsemide (DEMADEX) 10 MG tablet Take 1 tablet by mouth daily 30 tablet 6    losartan (COZAAR) 25 MG tablet Take 1 tablet by mouth daily 90 tablet 3    acetaminophen (TYLENOL) 325 MG tablet Take 650 mg by mouth every 6 hours as needed for Pain      Misc Natural Products (GLUCOS-CHONDROIT-MSM COMPLEX) TABS Take by mouth 2 times daily      warfarin (COUMADIN) 2 MG tablet Take 2 mg by mouth daily  metoprolol (TOPROL XL) 25 MG XL tablet Take 1 tablet by mouth daily (Patient taking differently: Take 25 mg by mouth daily 1/2 tab daily) 30 tablet 3    oxybutynin (DITROPAN) 5 MG tablet Take 5 mg by mouth daily.  co-enzyme Q-10 (COQ10) 30 MG capsule Take 100 mg by mouth daily       levothyroxine (LEVOTHROID) 75 MCG tablet Take 75 mcg by mouth daily.  vitamin B-12 (CYANOCOBALAMIN) 500 MCG tablet Take 1,000 mcg by mouth daily       folic acid (FOLVITE) 1 MG tablet Take 400 mcg by mouth daily        No current facility-administered medications for this visit.         Social History:  Social History     Socioeconomic History    Marital status:      Spouse name: Not on file    Number of children: Not on file    Years of education: Not on file    Highest education level: Not on file   Occupational History    Not on file   Social Needs    Financial resource strain: Not on file    Food insecurity     Worry: Not on file     Inability: Not on file    Transportation needs     Medical: Not on file     Non-medical: Not on file   Tobacco Use    Smoking status: Former Smoker    Smokeless tobacco: Never Used    Tobacco comment: quit 20 years   Substance and Sexual Activity    Alcohol use: Yes     Comment: minimal/1 glass of wine each night    Drug use: No    Sexual activity: Not on file   Lifestyle    Physical activity     Days per week: Not on file     Minutes per session: Not on file    Stress: Not on file   Relationships    Social connections     Talks on phone: Not on file     Gets together: Not on file     Attends Jainism service: Not on file     Active member of club or organization: Not on file     Attends meetings of clubs or organizations: Not on file     Relationship status: Not on file    Intimate partner violence     Fear of current or ex partner: Not on file     Emotionally abused: Not on file     Physically abused: Not on file     Forced sexual activity: Not on file

## 2020-07-10 LAB
ANION GAP SERPL CALCULATED.3IONS-SCNC: 12 MMOL/L (ref 6–18)
BUN BLDV-MCNC: 29 MG/DL (ref 8–26)
CALCIUM SERPL-MCNC: 9.6 MG/DL (ref 8.8–10.1)
CHLORIDE BLD-SCNC: 102 MEQ/L (ref 101–111)
CO2: 27 MMOL/L (ref 22–29)
CREAT SERPL-MCNC: 1.17 MG/DL (ref 0.44–1.03)
GFR AFRICAN AMERICAN: 47 ML/MIN/1.73 M2
GFR NON-AFRICAN AMERICAN: 41 ML/MIN/1.73 M2
GLUCOSE BLD-MCNC: 136 MG/DL (ref 70–99)
POTASSIUM SERPL-SCNC: 4.6 MEQ/L (ref 3.6–5.1)
SODIUM BLD-SCNC: 141 MEQ/L (ref 135–145)

## 2020-07-13 ENCOUNTER — TELEPHONE (OUTPATIENT)
Dept: CARDIOLOGY CLINIC | Age: 85
End: 2020-07-13

## 2020-07-13 NOTE — TELEPHONE ENCOUNTER
Spoke w/ pt and her . There is some confusion about her meds. Joyceann Sever did not cut Toprol in half. She stopped Aldactone as advised and is taking Torsemide 5mg daily (our med list had 10mg QD). She has swelling behind her knee above where her compression hose are. She is tired, SOB w/ labored breathing. B/P 140/70 w/ HR's 44-57. Per Dr Kay>cut Toprol in half (12.5mg) daily and double Torsemide to 10mg daily, call Friday with update. Spoke w/ pt and her . Gave detailed instructions on meds, advised to monitor b/p and HR daily and call on Friday with status. They said they are leaving Thursday or Friday morning to go to [place] but will call with how she is doing.

## 2020-07-17 ENCOUNTER — TELEPHONE (OUTPATIENT)
Dept: CARDIOLOGY CLINIC | Age: 85
End: 2020-07-17

## 2020-07-17 NOTE — TELEPHONE ENCOUNTER
Patient's  called w/ status update prior to leaving for [place]. He states her legs are not swollen anymore. B/p's this past week have been 130's-150's/70's-80's. He states it is difficult to measure her HR as it is irregular at times. Per Dr Kay>continue meds as they are, but try to elevate legs as much as possible while traveling and wear compression hose. I relayed instructions to pt and her . They verbalized understanding.

## 2020-07-27 ENCOUNTER — TELEPHONE (OUTPATIENT)
Dept: CARDIOLOGY CLINIC | Age: 85
End: 2020-07-27

## 2020-07-27 RX ORDER — LOSARTAN POTASSIUM 50 MG/1
50 TABLET ORAL DAILY
Qty: 90 TABLET | Refills: 3
Start: 2020-07-27 | End: 2021-05-18

## 2020-07-27 NOTE — TELEPHONE ENCOUNTER
Spoke w/ pt's , advised to double Losartan to 50mg daily and continue to monitor b/p. He verbalized understanding.

## 2020-07-30 RX ORDER — TORSEMIDE 10 MG/1
TABLET ORAL
Qty: 90 TABLET | Refills: 3 | Status: SHIPPED | OUTPATIENT
Start: 2020-07-30 | End: 2022-09-27 | Stop reason: SDUPTHER

## 2020-08-04 ENCOUNTER — TELEPHONE (OUTPATIENT)
Dept: CARDIOLOGY CLINIC | Age: 85
End: 2020-08-04

## 2020-08-04 NOTE — TELEPHONE ENCOUNTER
Pt calling for Charisse needs to talk to her about her medication because she is confused.  pls call to advise thank you

## 2020-08-04 NOTE — TELEPHONE ENCOUNTER
Spoke w/ pt and her daughter. Went over meds at length. They have requested a copy of her meds be mailed to [address].

## 2020-08-22 ENCOUNTER — EMERGENCY (EMERGENCY)
Facility: HOSPITAL | Age: 85
LOS: 1 days | End: 2020-08-22
Admitting: EMERGENCY MEDICINE
Payer: COMMERCIAL

## 2020-08-22 PROCEDURE — 99284 EMERGENCY DEPT VISIT MOD MDM: CPT

## 2020-09-08 ENCOUNTER — APPOINTMENT (OUTPATIENT)
Dept: CARDIOLOGY | Facility: CLINIC | Age: 85
End: 2020-09-08
Payer: MEDICARE

## 2020-09-08 ENCOUNTER — APPOINTMENT (OUTPATIENT)
Dept: CARDIOLOGY | Facility: CLINIC | Age: 85
End: 2020-09-08

## 2020-09-08 ENCOUNTER — NON-APPOINTMENT (OUTPATIENT)
Age: 85
End: 2020-09-08

## 2020-09-08 VITALS
WEIGHT: 119 LBS | RESPIRATION RATE: 12 BRPM | SYSTOLIC BLOOD PRESSURE: 124 MMHG | DIASTOLIC BLOOD PRESSURE: 82 MMHG | HEART RATE: 63 BPM

## 2020-09-08 DIAGNOSIS — I10 ESSENTIAL (PRIMARY) HYPERTENSION: ICD-10-CM

## 2020-09-08 DIAGNOSIS — I21.9 ACUTE MYOCARDIAL INFARCTION, UNSPECIFIED: ICD-10-CM

## 2020-09-08 DIAGNOSIS — Z87.891 PERSONAL HISTORY OF NICOTINE DEPENDENCE: ICD-10-CM

## 2020-09-08 DIAGNOSIS — I48.0 PAROXYSMAL ATRIAL FIBRILLATION: ICD-10-CM

## 2020-09-08 DIAGNOSIS — Z87.74 PERSONAL HISTORY OF (CORRECTED) CONGENITAL MALFORMATIONS OF HEART AND CIRCULATORY SYSTEM: ICD-10-CM

## 2020-09-08 DIAGNOSIS — Z95.1 PRESENCE OF AORTOCORONARY BYPASS GRAFT: ICD-10-CM

## 2020-09-08 DIAGNOSIS — Z79.01 LONG TERM (CURRENT) USE OF ANTICOAGULANTS: ICD-10-CM

## 2020-09-08 DIAGNOSIS — I34.0 NONRHEUMATIC MITRAL (VALVE) INSUFFICIENCY: ICD-10-CM

## 2020-09-08 DIAGNOSIS — Z78.9 OTHER SPECIFIED HEALTH STATUS: ICD-10-CM

## 2020-09-08 DIAGNOSIS — G45.9 TRANSIENT CEREBRAL ISCHEMIC ATTACK, UNSPECIFIED: ICD-10-CM

## 2020-09-08 DIAGNOSIS — Z80.51 FAMILY HISTORY OF MALIGNANT NEOPLASM OF KIDNEY: ICD-10-CM

## 2020-09-08 DIAGNOSIS — Z00.00 ENCOUNTER FOR GENERAL ADULT MEDICAL EXAMINATION W/OUT ABNORMAL FINDINGS: ICD-10-CM

## 2020-09-08 PROCEDURE — 99205 OFFICE O/P NEW HI 60 MIN: CPT

## 2020-09-08 PROCEDURE — 93000 ELECTROCARDIOGRAM COMPLETE: CPT

## 2020-09-08 RX ORDER — LEVOTHYROXINE SODIUM 0.07 MG/1
75 TABLET ORAL DAILY
Qty: 90 | Refills: 1 | Status: ACTIVE | COMMUNITY
Start: 2020-09-08

## 2020-09-08 RX ORDER — TORSEMIDE 10 MG/1
10 TABLET ORAL DAILY
Refills: 0 | Status: ACTIVE | COMMUNITY
Start: 2020-09-08

## 2020-09-08 RX ORDER — WARFARIN 2 MG/1
2 TABLET ORAL DAILY
Qty: 60 | Refills: 2 | Status: ACTIVE | COMMUNITY
Start: 2020-09-08

## 2020-09-08 RX ORDER — FOLIC ACID 1 MG/1
1 TABLET ORAL DAILY
Qty: 90 | Refills: 3 | Status: ACTIVE | COMMUNITY
Start: 2020-09-08

## 2020-09-08 RX ORDER — OXYBUTYNIN CHLORIDE 5 MG/1
5 TABLET, EXTENDED RELEASE ORAL
Qty: 30 | Refills: 3 | Status: ACTIVE | COMMUNITY
Start: 2020-09-08

## 2020-09-08 NOTE — HISTORY OF PRESENT ILLNESS
[FreeTextEntry1] : She has no chest pain\par She has some exertional shortness of breath\par She has no palpitations\par She has no syncope\par She is neurologically intact\par She has no edema\par She has no skin rashes\par

## 2020-09-08 NOTE — PHYSICAL EXAM
[General Appearance - Well Developed] : well developed [Normal Appearance] : normal appearance [Well Groomed] : well groomed [General Appearance - Well Nourished] : well nourished [No Deformities] : no deformities [General Appearance - In No Acute Distress] : no acute distress [Eyelids - No Xanthelasma] : the eyelids demonstrated no xanthelasmas [Normal Conjunctiva] : the conjunctiva exhibited no abnormalities [No Oral Pallor] : no oral pallor [Normal Oral Mucosa] : normal oral mucosa [No Oral Cyanosis] : no oral cyanosis [Normal Jugular Venous A Waves Present] : normal jugular venous A waves present [Normal Jugular Venous V Waves Present] : normal jugular venous V waves present [No Jugular Venous Vyas A Waves] : no jugular venous vyas A waves [Heart Rate And Rhythm] : heart rate and rhythm were normal [Heart Sounds] : normal S1 and S2 [Systolic grade ___/6] : A grade [unfilled]/6 systolic murmur was heard. [Exaggerated Use Of Accessory Muscles For Inspiration] : no accessory muscle use [Respiration, Rhythm And Depth] : normal respiratory rhythm and effort [Auscultation Breath Sounds / Voice Sounds] : lungs were clear to auscultation bilaterally [Abdomen Soft] : soft [Abdomen Tenderness] : non-tender [Abdomen Mass (___ Cm)] : no abdominal mass palpated [Gait - Sufficient For Exercise Testing] : the gait was sufficient for exercise testing [Abnormal Walk] : normal gait [Nail Clubbing] : no clubbing of the fingernails [Cyanosis, Localized] : no localized cyanosis [Petechial Hemorrhages (___cm)] : no petechial hemorrhages [Skin Color & Pigmentation] : normal skin color and pigmentation [] : no rash [Skin Lesions] : no skin lesions [No Venous Stasis] : no venous stasis [No Xanthoma] : no  xanthoma was observed [No Skin Ulcers] : no skin ulcer [Affect] : the affect was normal [Oriented To Time, Place, And Person] : oriented to person, place, and time [Mood] : the mood was normal [No Anxiety] : not feeling anxious

## 2020-09-08 NOTE — REASON FOR VISIT
[Consultation] : a consultation regarding [Atrial Fibrillation] : atrial fibrillation [Cardiomyopathy] : cardiomyopathy [Coronary Artery Disease] : coronary artery disease [Dyspnea] : dyspnea [Hypertension] : hypertension [Hyperlipidemia] : hyperlipidemia [Mitral Regurgitation] : mitral regurgitation [FreeTextEntry1] : I saw this 88-year-old very intact woman in cardiac consultation on 09/08/20\par In 1988, she had an acute myocardial infarction, with a VSD, and had surgical repair with a triple bypass.\par She has had no recurrent cardiac issues since.\par She does describe exertional dyspnea on moderate exertion.\par He has a history of paroxysmal atrial fibrillation on anticoagulation\par She says that her ejection fraction is 44%

## 2020-10-26 ENCOUNTER — TRANSCRIPTION ENCOUNTER (OUTPATIENT)
Age: 85
End: 2020-10-26

## 2020-11-05 NOTE — PROGRESS NOTES
[facility]   Cardiac Evaluation      Patient: Rosamaria Carey  YOB: 1931  Date: 11/13/20     Chief Complaint   Patient presents with    Hypertension    Hyperlipidemia    Cardiomyopathy      Referring provider: Adriano Sebastian MD    History of Present Illness:   Mrs. 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 She had repeat LHC in 2007 that revealed patent grafts. She is on Coumadin for atrial fibrillation. She has a known cmp with moderate valvular disease and RVSP of 45. She has had complaints of dry mouth and dizziness in the past.      Today, she states she feels fairly well. She denies exertional chest pain, STOKES/PND, palpitations, light-headedness, edema. Her  is also being seen today; they also spend time in [place], were there in Sept. Her BP has been doing better recently. She is intolerant to aldactone due to hyperkalemia. She was off lipitor due to elevated ALT of 126. I would like for her to try it again. Past Medical History:   has a past medical history of AF (atrial fibrillation) ([address] CAD (coronary artery disease), Hyperlipidemia, Hypertension, and Valvular heart disease. Surgical History:   has a past surgical history that includes Coronary artery bypass graft and Diagnostic Cardiac Cath Lab Procedure. Current Outpatient Medications   Medication Sig Dispense Refill    torsemide (DEMADEX) 10 MG tablet TAKE 1 TABLET DAILY 90 tablet 3    losartan (COZAAR) 50 MG tablet Take 1 tablet by mouth daily 90 tablet 3    metoprolol succinate (TOPROL XL) 25 MG extended release tablet Take 0.5 tablets by mouth daily 30 tablet 6    warfarin (COUMADIN) 2 MG tablet Take 2 mg by mouth daily      oxybutynin (DITROPAN) 5 MG tablet Take 5 mg by mouth daily.  levothyroxine (LEVOTHROID) 75 MCG tablet Take 75 mcg by mouth daily.         vitamin B-12 (CYANOCOBALAMIN) 500 MCG tablet Take 1,000 mcg by mouth daily       folic acid (FOLVITE) 1 MG tablet Take 400 mcg by mouth daily       co-enzyme Q-10 (COQ10) 30 MG capsule Take 100 mg by mouth daily        No current facility-administered medications for this visit. Social History:  Social History     Socioeconomic History    Marital status:      Spouse name: Not on file    Number of children: Not on file    Years of education: Not on file    Highest education level: Not on file   Occupational History    Not on file   Social Needs    Financial resource strain: Not on file    Food insecurity     Worry: Not on file     Inability: Not on file    Transportation needs     Medical: Not on file     Non-medical: Not on file   Tobacco Use    Smoking status: Former Smoker    Smokeless tobacco: Never Used    Tobacco comment: quit 20 years   Substance and Sexual Activity    Alcohol use: Yes     Comment: minimal/1 glass of wine each night    Drug use: No    Sexual activity: Not on file   Lifestyle    Physical activity     Days per week: Not on file     Minutes per session: Not on file    Stress: Not on file   Relationships    Social connections     Talks on phone: Not on file     Gets together: Not on file     Attends Buddhism service: Not on file     Active member of club or organization: Not on file     Attends meetings of clubs or organizations: Not on file     Relationship status: Not on file    Intimate partner violence     Fear of current or ex partner: Not on file     Emotionally abused: Not on file     Physically abused: Not on file     Forced sexual activity: Not on file   Other Topics Concern    Not on file   Social History Narrative    Not on file     Family History:  family history includes Cancer in her father and sister; Other in her mother. Allergies:  Levaquin [levofloxacin in d5w]     Review of Systems:   · Constitutional: there has been no unanticipated weight loss.  No change in energy or activity level · Eyes: No visual changes   · ENT: No Headaches, hearing loss or vertigo. No mouth sores or sore throat. · Cardiovascular: Reviewed in HPI  · Respiratory: No cough or wheezing, no sputum production. · Gastrointestinal: No abdominal pain, appetite loss, blood in stools. No change in bowel or bladder habits. · Genitourinary: No nocturia, dysuria, trouble voiding  · Musculoskeletal:  No gait disturbance, weakness +neck pain  · Integumentary: No rash or pruritis. · Neurological: No headache, change in muscle strength, numbness or tingling. No change in gait, balance, coordination, mood, affect, memory, mentation, behavior. · Psychiatric: No anxiety or depression  · Endocrine: No malaise or fever  · Hematologic/Lymphatic: No abnormal bruising or bleeding, blood clots or swollen lymph nodes. · Allergic/Immunologic: No nasal congestion or hives. Physical Examination:    Vitals:    11/13/20 0845   BP: 118/60   Site: Left Upper Arm   Position: Sitting   Cuff Size: Medium Adult   Pulse: 55   SpO2: 96%   Weight: 119 lb (54 kg)   Height: 5' 5\" (1.651 m)     Body mass index is 19.8 kg/m².      Wt Readings from Last 3 Encounters:   11/13/20 119 lb (54 kg)   06/26/20 122 lb (55.3 kg)   10/04/19 120 lb (54.4 kg)      BP Readings from Last 3 Encounters:   11/13/20 118/60   06/26/20 (!) 94/46   10/04/19 128/62      Constitutional and General Appearance:  appears stated age, frail  Eyes - no xanthelasma  Respiratory:  · Normal excursion and expansion without use of accessory muscles  · Resp Auscultation: Normal breath sounds without dullness  Cardiovascular:  · The apical impulses not displaced  · Heart is irregular in rhythm with normal S1, S2, 2/6 holosystolic murmur  · PMI is normal  · The carotid upstroke is normal, no bruit noted   · JVP is not elevated  · Peripheral pulses are symmetrical  · There is no clubbing, cyanosis of the extremities  · No edema today, severe varicose veins and hyperpigmentation of the lower legs; she states she usually wears compression stockings. · Femoral Arteries: 2+ and equal without bruits  · Pedal Pulses: 2+ and equal   Abdomen:  · No masses or tenderness  · Aorta not palpable  · Normal bowel sounds  Neurological/Psychiatric:  · Alert and oriented x3  · Moves all extremities well  · Exhibits normal gait balance and coordination    Assessment:  1. Atherosclerosis of native coronary artery of native heart without angina pectoris: Stable without anginal symtoms. -EKG today 11/13/2020>   -Echo 12/11/18> Mild cLVH. Mildly decreased left ventricular systolic function with distal inferoseptal and apical akinesis. No evidence of clot or shunt. Hx of ventricular septum aneurysm and rupture repair. EF 40-45%. Mitral valve sclerosis with mild posterior mitral valve prolapse. Moderate mitral regurgitation with no pulmonary vein reversal. The left atrium is dilated. The aortic valve appears tricuspid with mild sclerosis no stenosis. Moderate aortic regurgitation is present. Moderate-severe tricuspid regurgitation. RVSP 45mmHg. The right atrium is severely dilated. Inferior vena cava appears dilated  -CABG 10/1988> repair of ventricular septal rupture. CABG w/ SVG-intermediate and OM branch and the posterolateral branch of the Cx, LAD is occluded. 2. Essential hypertension, benign: Stable. Home B/P's are good. Discontinued aldactone due to K+ 5.5 on 6/23/2020. Blood pressure 118/60, pulse 55, height 5' 5\" (1.651 m), weight 119 lb (54 kg), SpO2 96%     3. Hyperlipidemia, unspecified hyperlipidemia type  Not taking Lipitor 10mg daily d/u elevated liver enzymes. I would like her to restart Lipitor and will keep close eye on liver enzymes. Labs 6/25/20> , TRIG 63, HDL 61, LDL 66, AST 46, ALT 12     4. Atrial fibrillation, now seems to be chronic with controlled ventricular response: HR irregular & controlled. Now on low dose Toprol. Dr. Nessa Tristan regulates Coumadin, checks INR at home.   EKG today Af with controlled rate and RBBB and right axis and an occasional PVC     5. Edema, h/o: None today. from varicose veins, advised to continue to wear compression hose, low Na diet, and limit water consumption. 6.  SOB, h/o: Stable    7. Elevated liver enzymes in the past: , then 67 in 2018. She can drink 1 glass of wine per night    8. Volume depletion, h/o: She is hypotensive at times, fatigued, and has prerenal azotemia. PLAN:  Restart Lipitor 10mg. Receck lipid panel/liver enzymes in 3 months. Return for follow up in  6 months. Thank you for allowing to me to participate in the care of Marcos Flores. Scribe's attestation: This note was scribed in the presence of Dr. Valentina Mancera MD, by Nic Santana RN. The scribe's documentation has been prepared under my direction and personally reviewed by me in its entirety. I confirm that the note above accurately reflects all work, treatment, procedures, and medical decision making performed by me.

## 2020-11-13 ENCOUNTER — OFFICE VISIT (OUTPATIENT)
Dept: CARDIOLOGY CLINIC | Age: 85
End: 2020-11-13
Payer: MEDICARE

## 2020-11-13 VITALS
HEART RATE: 55 BPM | OXYGEN SATURATION: 96 % | WEIGHT: 119 LBS | BODY MASS INDEX: 19.83 KG/M2 | DIASTOLIC BLOOD PRESSURE: 60 MMHG | SYSTOLIC BLOOD PRESSURE: 118 MMHG | HEIGHT: 65 IN

## 2020-11-13 PROCEDURE — 93000 ELECTROCARDIOGRAM COMPLETE: CPT | Performed by: INTERNAL MEDICINE

## 2020-11-13 PROCEDURE — 99214 OFFICE O/P EST MOD 30 MIN: CPT | Performed by: INTERNAL MEDICINE

## 2020-11-13 RX ORDER — ATORVASTATIN CALCIUM 10 MG/1
10 TABLET, FILM COATED ORAL DAILY
Qty: 90 TABLET | Refills: 3 | Status: SHIPPED | OUTPATIENT
Start: 2020-11-13 | End: 2020-11-13 | Stop reason: SDUPTHER

## 2020-11-13 RX ORDER — ATORVASTATIN CALCIUM 10 MG/1
10 TABLET, FILM COATED ORAL DAILY
Qty: 90 TABLET | Refills: 3 | Status: SHIPPED | OUTPATIENT
Start: 2020-11-13

## 2021-01-05 LAB
ALT SERPL-CCNC: 19 IU/L (ref 10–35)
AST SERPL-CCNC: 56 IU/L (ref 10–35)
CHOLESTEROL, TOTAL: 113 MG/DL
HDLC SERPL-MCNC: 61 MG/DL
LDL CHOLESTEROL CALCULATED: 42 MG/DL
NONHDLC SERPL-MCNC: 52 MG/DL
TRIGL SERPL-MCNC: 50 MG/DL

## 2021-05-18 ENCOUNTER — OFFICE VISIT (OUTPATIENT)
Dept: CARDIOLOGY CLINIC | Age: 86
End: 2021-05-18
Payer: MEDICARE

## 2021-05-18 VITALS
WEIGHT: 122 LBS | HEART RATE: 58 BPM | HEIGHT: 65 IN | SYSTOLIC BLOOD PRESSURE: 150 MMHG | BODY MASS INDEX: 20.33 KG/M2 | OXYGEN SATURATION: 96 % | DIASTOLIC BLOOD PRESSURE: 70 MMHG

## 2021-05-18 DIAGNOSIS — R60.0 LOCALIZED EDEMA: ICD-10-CM

## 2021-05-18 DIAGNOSIS — I25.10 CORONARY ARTERY DISEASE INVOLVING NATIVE CORONARY ARTERY OF NATIVE HEART WITHOUT ANGINA PECTORIS: ICD-10-CM

## 2021-05-18 DIAGNOSIS — I10 ESSENTIAL HYPERTENSION: ICD-10-CM

## 2021-05-18 DIAGNOSIS — I50.42 SYSTOLIC AND DIASTOLIC CHF, CHRONIC (HCC): Primary | ICD-10-CM

## 2021-05-18 PROCEDURE — 99214 OFFICE O/P EST MOD 30 MIN: CPT | Performed by: INTERNAL MEDICINE

## 2021-05-18 RX ORDER — LOSARTAN POTASSIUM 25 MG/1
25 TABLET ORAL DAILY
COMMUNITY
Start: 2021-05-13 | End: 2022-06-02

## 2021-05-18 NOTE — PROGRESS NOTES
mouth daily       folic acid (FOLVITE) 1 MG tablet Take 400 mcg by mouth daily        No current facility-administered medications for this visit. Social History:  Social History     Socioeconomic History    Marital status:      Spouse name: Not on file    Number of children: Not on file    Years of education: Not on file    Highest education level: Not on file   Occupational History    Not on file   Tobacco Use    Smoking status: Former Smoker    Smokeless tobacco: Never Used    Tobacco comment: quit 20 years   Vaping Use    Vaping Use: Never used   Substance and Sexual Activity    Alcohol use: Yes     Comment: minimal/1 glass of wine each night    Drug use: No    Sexual activity: Not on file   Other Topics Concern    Not on file   Social History Narrative    Not on file     Social Determinants of Health     Financial Resource Strain:     Difficulty of Paying Living Expenses:    Food Insecurity:     Worried About [address] in the Last Year:     [address] [employer] in the Last Year:    Transportation Needs:     Lack of Transportation (Medical):  Lack of Transportation (Non-Medical):    Physical Activity:     Days of Exercise per Week:     Minutes of Exercise per Session:    Stress:     Feeling of Stress :    Social Connections:     Frequency of Communication with Friends and Family:     Frequency of Social Gatherings with Friends and Family:     Attends Confucianist Services:     Active Member of Clubs or Organizations:     Attends Club or Organization Meetings:     Marital Status:    Intimate Partner Violence:     Fear of Current or Ex-Partner:     Emotionally Abused:     Physically Abused:     Sexually Abused:      Family History:  family history includes Cancer in her father and sister; Other in her mother. Allergies:  Levaquin [levofloxacin in d5w] and Spironolactone     Review of Systems:   · Constitutional: there has been no unanticipated weight loss.  No change in energy or activity level   · Eyes: No visual changes   · ENT: No Headaches, hearing loss or vertigo. No mouth sores or sore throat. · Cardiovascular: Reviewed in HPI  · Respiratory: No cough or wheezing, no sputum production. · Gastrointestinal: No abdominal pain, appetite loss, blood in stools. No change in bowel or bladder habits. · Genitourinary: No nocturia, dysuria, trouble voiding  · Musculoskeletal:  No gait disturbance, weakness  · Integumentary: No rash or pruritis. · Neurological: No headache, change in muscle strength, numbness or tingling. No change in gait, balance, coordination, mood, affect, memory, mentation, behavior. · Psychiatric: No anxiety or depression  · Endocrine: No malaise or fever  · Hematologic/Lymphatic: No abnormal bruising or bleeding, blood clots or swollen lymph nodes. · Allergic/Immunologic: No nasal congestion or hives. Physical Examination:    Vitals:    05/18/21 0905 05/18/21 0910   BP: (!) 160/70 (!) 150/70   Site: Right Upper Arm Right Upper Arm   Position: Sitting Sitting   Cuff Size: Medium Adult Medium Adult   Pulse: 58    SpO2: 96%    Weight: 122 lb (55.3 kg)    Height: 5' 5\" (1.651 m)      Body mass index is 20.3 kg/m².      Wt Readings from Last 3 Encounters:   05/18/21 122 lb (55.3 kg)   11/13/20 119 lb (54 kg)   06/26/20 122 lb (55.3 kg)      BP Readings from Last 3 Encounters:   05/18/21 (!) 150/70   11/13/20 118/60   06/26/20 (!) 94/46      Constitutional and General Appearance:  appears stated age, frail  Eyes - no xanthelasma  Respiratory:  · Normal excursion and expansion without use of accessory muscles  · Resp Auscultation: Normal breath sounds without dullness  Cardiovascular:  · The apical impulses not displaced  · Heart is irregular in rhythm with normal S1, S2, 2/6 holosystolic murmur  · PMI is normal  · The carotid upstroke is normal, no bruit noted   · JVP is not elevated  · Peripheral pulses are symmetrical  · There is no clubbing, cyanosis of the extremities  · Left leg is wrapped from MOHS surgery yesterday, severe varicose veins and hyperpigmentation of the lower legs;  she usually wears compression stockings. · Femoral Arteries: 2+ and equal without bruits  · Pedal Pulses: 2+ and equal   Abdomen:  · No masses or tenderness  · Aorta not palpable  · Normal bowel sounds  Neurological/Psychiatric:  · Alert and oriented x3  · Moves all extremities well  · Exhibits normal gait balance and coordination    Assessment:  1. Atherosclerosis of native coronary artery of native heart without angina pectoris: Stable without anginal symtoms  -Echo 12/11/18> Mild cLVH. Mildly decreased left ventricular systolic function with distal inferoseptal and apical akinesis. No evidence of clot or shunt. Hx of ventricular septum aneurysm and rupture repair. EF 40-45%. Mitral valve sclerosis with mild posterior mitral valve prolapse. Moderate mitral regurgitation with no pulmonary vein reversal. The left atrium is dilated. The aortic valve appears tricuspid with mild sclerosis no stenosis. Moderate aortic regurgitation is present. Moderate-severe tricuspid regurgitation. RVSP 45mmHg. The right atrium is severely dilated. Inferior vena cava appears dilated  -CABG 10/1988> repair of ventricular septal rupture. CABG w/ SVG-intermediate and OM branch and the posterolateral branch of the Cx, LAD is occluded. 2. Essential hypertension, benign: Stable. Home B/P's are good. Discontinued aldactone due to K+ 5.5 on 6/23/2020.   3. Hyperlipidemia, unspecified hyperlipidemia type  Not taking Lipitor 10mg daily d/u elevated liver enzymes. I would like her to restart Lipitor and will keep close eye on liver enzymes. Labs 5/10/21: ; TRIG 57; HDL 64; LDL 43   4. Atrial fibrillation, now seems to be chronic with controlled ventricular response: HR irregular & controlled. Now on low dose Toprol. Dr. Alan Villarreal regulates Coumadin, checks INR at home.    5.  Edema, h/o: None today. from varicose veins, advised to continue to wear compression hose, low Na diet, and limit water consumption. 6.  SOB, h/o: O2 sats at rest on room air 98% w/ HR 57bpm. After walking hallway O2 sats 95%    7. Elevated liver enzymes in the past: AST 39, ALT 9 on labs 5/10/21    8. Volume depletion, h/o: She is hypotensive at times, fatigued, and continues with  prerenal azotemia. PLAN:  She appears stable but needs to drink more. . No med changes. FU 4 months. Thank you for allowing to me to participate in the care of Melvin Rincon. Scribe's attestation: This note was scribed in the presence of Dr Minnie Vasquez MD by Cain Bledsoe RN. The scribe's documentation has been prepared under my direction and personally reviewed by me in its entirety. I confirm that the note above accurately reflects all work, treatment, procedures, and medical decision making performed by me.

## 2021-05-21 ENCOUNTER — TELEPHONE (OUTPATIENT)
Dept: CARDIOLOGY CLINIC | Age: 86
End: 2021-05-21

## 2021-08-09 ENCOUNTER — TRANSCRIPTION ENCOUNTER (OUTPATIENT)
Age: 86
End: 2021-08-09

## 2021-08-31 ENCOUNTER — TRANSCRIPTION ENCOUNTER (OUTPATIENT)
Age: 86
End: 2021-08-31

## 2021-09-15 ENCOUNTER — TRANSCRIPTION ENCOUNTER (OUTPATIENT)
Age: 86
End: 2021-09-15

## 2021-10-13 ENCOUNTER — OFFICE VISIT (OUTPATIENT)
Dept: CARDIOLOGY CLINIC | Age: 86
End: 2021-10-13
Payer: MEDICARE

## 2021-10-13 VITALS
BODY MASS INDEX: 19.99 KG/M2 | HEIGHT: 65 IN | DIASTOLIC BLOOD PRESSURE: 64 MMHG | SYSTOLIC BLOOD PRESSURE: 110 MMHG | WEIGHT: 120 LBS | HEART RATE: 52 BPM | OXYGEN SATURATION: 99 %

## 2021-10-13 DIAGNOSIS — E78.5 HYPERLIPIDEMIA, UNSPECIFIED HYPERLIPIDEMIA TYPE: ICD-10-CM

## 2021-10-13 DIAGNOSIS — I25.5 ISCHEMIC CARDIOMYOPATHY: Primary | ICD-10-CM

## 2021-10-13 DIAGNOSIS — I48.20 CHRONIC ATRIAL FIBRILLATION (HCC): ICD-10-CM

## 2021-10-13 DIAGNOSIS — I10 ESSENTIAL HYPERTENSION: ICD-10-CM

## 2021-10-13 DIAGNOSIS — I25.10 CORONARY ARTERY DISEASE INVOLVING NATIVE CORONARY ARTERY OF NATIVE HEART WITHOUT ANGINA PECTORIS: ICD-10-CM

## 2021-10-13 PROCEDURE — 99214 OFFICE O/P EST MOD 30 MIN: CPT | Performed by: INTERNAL MEDICINE

## 2021-10-13 NOTE — PROGRESS NOTES
[address]   Cardiac Evaluation      Patient: Sadi Zapata  YOB: 1931  Date: 10/13/21     Chief Complaint   Patient presents with    Atrial Fibrillation    Cardiomyopathy      Referring provider: Dom Sexton MD    History of Present Illness:   Mrs. 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 She had repeat LHC in 2007 that revealed patent grafts. She is on Coumadin for atrial fibrillation. She has a known cmp with moderate valvular disease and RVSP of 45. She has had complaints of dry mouth and dizziness in the past.      Today, she is here with her . She has a brace on her left leg. Ami's main complaint is inability to sleep. She states she sleeps 4 hours per night. Her pulse rate has been slow. She denies any chest pain, palpitations, dizziness, or edema. Past Medical History:   has a past medical history of AF (atrial fibrillation) ([address] CAD (coronary artery disease), Hyperlipidemia, Hypertension, and Valvular heart disease. Surgical History:   has a past surgical history that includes Coronary artery bypass graft and Diagnostic Cardiac Cath Lab Procedure. Current Outpatient Medications   Medication Sig Dispense Refill    losartan (COZAAR) 25 MG tablet Take 25 mg by mouth daily      atorvastatin (LIPITOR) 10 MG tablet Take 1 tablet by mouth daily 90 tablet 3    torsemide (DEMADEX) 10 MG tablet TAKE 1 TABLET DAILY (Patient taking differently: 1/2 tab) 90 tablet 3    metoprolol succinate (TOPROL XL) 25 MG extended release tablet Take 0.5 tablets by mouth daily 30 tablet 6    warfarin (COUMADIN) 2 MG tablet Take 2 mg by mouth daily      oxybutynin (DITROPAN) 5 MG tablet Take 5 mg by mouth daily.  co-enzyme Q-10 (COQ10) 30 MG capsule Take 100 mg by mouth daily       levothyroxine (LEVOTHROID) 75 MCG tablet Take 75 mcg by mouth daily.         vitamin B-12 (CYANOCOBALAMIN) 500 MCG tablet Take 1,000 mcg by mouth daily       folic acid (FOLVITE) 1 MG tablet Take 400 mcg by mouth daily        No current facility-administered medications for this visit. Social History:  Social History     Socioeconomic History    Marital status:      Spouse name: Not on file    Number of children: Not on file    Years of education: Not on file    Highest education level: Not on file   Occupational History    Not on file   Tobacco Use    Smoking status: Former Smoker    Smokeless tobacco: Never Used    Tobacco comment: quit 20 years   Vaping Use    Vaping Use: Never used   Substance and Sexual Activity    Alcohol use: Yes     Comment: minimal/1 glass of wine each night    Drug use: No    Sexual activity: Not on file   Other Topics Concern    Not on file   Social History Narrative    Not on file     Social Determinants of Health     Financial Resource Strain:     Difficulty of Paying Living Expenses:    Food Insecurity:     Worried About [address] in the Last Year:     [address] in the Last Year:    Transportation Needs:     Lack of Transportation (Medical):  Lack of Transportation (Non-Medical):    Physical Activity:     Days of Exercise per Week:     Minutes of Exercise per Session:    Stress:     Feeling of Stress :    Social Connections:     Frequency of Communication with Friends and Family:     Frequency of Social Gatherings with Friends and Family:     Attends Temple Services:     Active Member of Clubs or Organizations:     Attends Club or Organization Meetings:     Marital Status:    Intimate Partner Violence:     Fear of Current or Ex-Partner:     Emotionally Abused:     Physically Abused:     Sexually Abused:      Family History:  family history includes Cancer in her father and sister; Other in her mother.      Allergies:  Levaquin [levofloxacin in d5w] and Spironolactone     Review of Systems:   · Constitutional: there has been no unanticipated weight loss. No change in energy or activity level   · Eyes: No visual changes   · ENT: No Headaches, hearing loss or vertigo. No mouth sores or sore throat. · Cardiovascular: Reviewed in HPI  · Respiratory: No cough or wheezing, no sputum production. · Gastrointestinal: No abdominal pain, appetite loss, blood in stools. No change in bowel or bladder habits. · Genitourinary: No nocturia, dysuria, trouble voiding  · Musculoskeletal:  No gait disturbance, weakness  · Integumentary: No rash or pruritis. · Neurological: No headache, change in muscle strength, numbness or tingling. No change in gait, balance, coordination, mood, affect, memory, mentation, behavior. · Psychiatric: No anxiety or depression  · Endocrine: No malaise or fever  · Hematologic/Lymphatic: No abnormal bruising or bleeding, blood clots or swollen lymph nodes. · Allergic/Immunologic: No nasal congestion or hives. Physical Examination:    Vitals:    10/13/21 0941   BP: 110/64   Site: Left Upper Arm   Position: Sitting   Cuff Size: Medium Adult   Pulse: 52   SpO2: 99%   Weight: 120 lb (54.4 kg)   Height: 5' 5\" (1.651 m)     Body mass index is 19.97 kg/m².      Wt Readings from Last 3 Encounters:   10/13/21 120 lb (54.4 kg)   05/18/21 122 lb (55.3 kg)   11/13/20 119 lb (54 kg)      BP Readings from Last 3 Encounters:   10/13/21 110/64   05/18/21 (!) 150/70   11/13/20 118/60      Constitutional and General Appearance:  appears stated age, frail  Eyes - no xanthelasma  Respiratory:  · Normal excursion and expansion without use of accessory muscles  · Resp Auscultation: Normal breath sounds without dullness  Cardiovascular:  · The apical impulses not displaced  · Heart is irregular in rhythm with normal S1, S2, 2/6 holosystolic murmur  · PMI is normal  · The carotid upstroke is normal, no bruit noted   · JVP is not elevated  · Peripheral pulses are symmetrical  · There is no clubbing, cyanosis of the extremities  ·  severe varicose veins and hyperpigmentation of the lower legs;  she usually wears compression stockings. · Femoral Arteries: 2+ and equal without bruits  · Pedal Pulses: 2+ and equal   Abdomen:  · No masses or tenderness  · Aorta not palpable  · Normal bowel sounds  Neurological/Psychiatric:  · Alert and oriented x3  · Moves all extremities well  · Exhibits normal gait balance and coordination    Assessment:  1. Atherosclerosis of native coronary artery of native heart without angina pectoris: Stable without anginal symtoms  -Echo 12/11/18> Mild cLVH. Mildly decreased left ventricular systolic function with distal inferoseptal and apical akinesis. No evidence of clot or shunt. Hx of ventricular septum aneurysm and rupture repair. EF 40-45%. Mitral valve sclerosis with mild posterior mitral valve prolapse. Moderate mitral regurgitation with no pulmonary vein reversal. The left atrium is dilated. The aortic valve appears tricuspid with mild sclerosis no stenosis. Moderate aortic regurgitation is present. Moderate-severe tricuspid regurgitation. RVSP 45mmHg. The right atrium is severely dilated. Inferior vena cava appears dilated  -CABG 10/1988> repair of ventricular septal rupture. CABG w/ SVG-intermediate and OM branch and the posterolateral branch of the Cx, LAD is occluded. 2. Essential hypertension, benign: Stable. Home B/P's are good. Discontinued aldactone due to K+ 5.5 on 6/23/2020.   3. Hyperlipidemia, unspecified hyperlipidemia type  Not taking Lipitor 10mg daily d/u elevated liver enzymes. I would like her to restart Lipitor and will keep close eye on liver enzymes. Labs 5/10/21: ; TRIG 57; HDL 64; LDL 43   4. Atrial fibrillation, now seems to be chronic with controlled ventricular response: HR irregular & controlled. Dr. Marquise Duque regulates Coumadin, checks INR at home. 5.  Edema, h/o: None today. from varicose veins, advised to continue to wear compression hose, low Na diet, and limit water consumption.     6. SOB, h/o:  Multifactorial,  deconditioned    7. Elevated liver enzymes in the past: AST 39, ALT 9 on labs 5/10/21    8. Volume depletion, h/o: She is hypotensive at times, fatigued, and continues with  prerenal azotemia. PLAN:  Will stop Toprol. For sleep aide will try Melatonin 5mg nightly. Regular exercise is encouraged, recumbent bike is recommended. FU 6 months. Thank you for allowing to me to participate in the care of Douglas Iverson. Scribe's attestation: This note was scribed in the presence of Dr Brennan Winslow MD by Keisha Garsia RN. The scribe's documentation has been prepared under my direction and personally reviewed by me in its entirety. I confirm that the note above accurately reflects all work, treatment, procedures, and medical decision making performed by me.

## 2022-05-16 ENCOUNTER — OFFICE VISIT (OUTPATIENT)
Dept: CARDIOLOGY CLINIC | Age: 87
End: 2022-05-16
Payer: MEDICARE

## 2022-05-16 VITALS
HEIGHT: 65 IN | HEART RATE: 57 BPM | DIASTOLIC BLOOD PRESSURE: 60 MMHG | SYSTOLIC BLOOD PRESSURE: 130 MMHG | WEIGHT: 124 LBS | OXYGEN SATURATION: 98 % | BODY MASS INDEX: 20.66 KG/M2

## 2022-05-16 DIAGNOSIS — E78.5 HYPERLIPIDEMIA, UNSPECIFIED HYPERLIPIDEMIA TYPE: ICD-10-CM

## 2022-05-16 DIAGNOSIS — R53.83 OTHER FATIGUE: ICD-10-CM

## 2022-05-16 DIAGNOSIS — I10 ESSENTIAL HYPERTENSION: ICD-10-CM

## 2022-05-16 DIAGNOSIS — I50.42 SYSTOLIC AND DIASTOLIC CHF, CHRONIC (HCC): ICD-10-CM

## 2022-05-16 DIAGNOSIS — I48.20 CHRONIC ATRIAL FIBRILLATION (HCC): ICD-10-CM

## 2022-05-16 DIAGNOSIS — I25.10 CORONARY ARTERY DISEASE INVOLVING NATIVE CORONARY ARTERY OF NATIVE HEART WITHOUT ANGINA PECTORIS: Primary | ICD-10-CM

## 2022-05-16 PROCEDURE — 99214 OFFICE O/P EST MOD 30 MIN: CPT | Performed by: INTERNAL MEDICINE

## 2022-05-16 NOTE — PROGRESS NOTES
[address]   Cardiac Evaluation      Patient: Marquise Rodriguez  YOB: 1931  Date: 5/16/22     Chief Complaint   Patient presents with    Atrial Fibrillation    Coronary Artery Disease    Hypertension    Hyperlipidemia      Referring provider: Kingsley Noyola MD    History of Present Illness:   Mrs. 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 She had repeat LHC in 2007 that revealed patent grafts. She is on Coumadin for atrial fibrillation. She has a known cmp with moderate valvular disease and RVSP of 45. She has had complaints of dry mouth and dizziness in the past.      Today, she is here with her . Her  states he is concerned about her. She does all the driving, bills, cleaning the house. Her main complaint is feeling tired. She denies chest pain, palpitations, dizziness. Her left leg swells and she is on a small dose of demadex. Past Medical History:   has a past medical history of AF (atrial fibrillation) ([address] CAD (coronary artery disease), Hyperlipidemia, Hypertension, and Valvular heart disease. Surgical History:   has a past surgical history that includes Coronary artery bypass graft and Diagnostic Cardiac Cath Lab Procedure. Current Outpatient Medications   Medication Sig Dispense Refill    Multiple Vitamins-Minerals (ICAPS AREDS 2 PO) Take by mouth      losartan (COZAAR) 25 MG tablet Take 25 mg by mouth daily      atorvastatin (LIPITOR) 10 MG tablet Take 1 tablet by mouth daily 90 tablet 3    torsemide (DEMADEX) 10 MG tablet TAKE 1 TABLET DAILY (Patient taking differently: 1/2 tab) 90 tablet 3    warfarin (COUMADIN) 2 MG tablet Take 2 mg by mouth daily      oxybutynin (DITROPAN) 5 MG tablet Take 5 mg by mouth daily.  co-enzyme Q-10 (COQ10) 30 MG capsule Take 100 mg by mouth daily       levothyroxine (LEVOTHROID) 75 MCG tablet Take 75 mcg by mouth daily.         vitamin B-12 (CYANOCOBALAMIN) 500 MCG tablet Take 1,000 mcg by mouth daily       folic acid (FOLVITE) 1 MG tablet Take 400 mcg by mouth daily        No current facility-administered medications for this visit. Social History:  Social History     Socioeconomic History    Marital status:      Spouse name: Not on file    Number of children: Not on file    Years of education: Not on file    Highest education level: Not on file   Occupational History    Not on file   Tobacco Use    Smoking status: Former Smoker    Smokeless tobacco: Never Used    Tobacco comment: quit 20 years   Vaping Use    Vaping Use: Never used   Substance and Sexual Activity    Alcohol use: Yes     Comment: minimal/1 glass of wine each night    Drug use: No    Sexual activity: Not on file   Other Topics Concern    Not on file   Social History Narrative    Not on file     Social Determinants of Health     Financial Resource Strain:     Difficulty of Paying Living Expenses: Not on file   Food Insecurity:     Worried About [address] in the Last Year: Not on file    [address] in the Last Year: Not on file   Transportation Needs:     Lack of Transportation (Medical): Not on file    Lack of Transportation (Non-Medical):  Not on file   Physical Activity:     Days of Exercise per Week: Not on file    Minutes of Exercise per Session: Not on file   Stress:     Feeling of Stress : Not on file   Social Connections:     Frequency of Communication with Friends and Family: Not on file    Frequency of Social Gatherings with Friends and Family: Not on file    Attends [employer] Services: Not on file    Active Member of Clubs or Organizations: Not on file    Attends Club or Organization Meetings: Not on file    Marital Status: Not on file   Intimate Partner Violence:     Fear of Current or Ex-Partner: Not on file    Emotionally Abused: Not on file    Physically Abused: Not on file    Sexually Abused: Not on file   Housing Stability:     Unable to Pay for Housing in the Last Year: Not on file    Number of Places Lived in the Last Year: Not on file    Unstable Housing in the Last Year: Not on file     Family History:  family history includes Cancer in her father and sister; Other in her mother. Allergies:  Levaquin [levofloxacin in d5w] and Spironolactone     Review of Systems:   · Constitutional: there has been no unanticipated weight loss. + fatigue   · Eyes: No visual changes   · ENT: No Headaches, hearing loss or vertigo. No mouth sores or sore throat. · Cardiovascular: Reviewed in HPI  · Respiratory: No cough or wheezing, no sputum production. · Gastrointestinal: No abdominal pain, appetite loss, blood in stools. No change in bowel or bladder habits. · Genitourinary: No nocturia, dysuria, trouble voiding  · Musculoskeletal:  No gait disturbance, weakness  · Integumentary: No rash or pruritis. · Neurological: No headache, change in muscle strength, numbness or tingling. No change in gait, balance, coordination, mood, affect, memory, mentation, behavior. · Psychiatric: No anxiety or depression  · Endocrine: No malaise or fever  · Hematologic/Lymphatic: No abnormal bruising or bleeding, blood clots or swollen lymph nodes. · Allergic/Immunologic: No nasal congestion or hives. Physical Examination:    Vitals:    05/16/22 1442   BP: 130/60   Site: Left Upper Arm   Position: Sitting   Pulse: 57   SpO2: 98%   Weight: 124 lb (56.2 kg)   Height: 5' 5\" (1.651 m)     Body mass index is 20.63 kg/m².      Wt Readings from Last 3 Encounters:   05/16/22 124 lb (56.2 kg)   10/13/21 120 lb (54.4 kg)   05/18/21 122 lb (55.3 kg)      BP Readings from Last 3 Encounters:   05/16/22 130/60   10/13/21 110/64   05/18/21 (!) 150/70      Constitutional and General Appearance:  appears stated age, frail  Eyes - no xanthelasma  Respiratory:  · Normal excursion and expansion without use of accessory muscles  · Resp Auscultation: Normal breath sounds without dullness  Cardiovascular:  · The apical impulses not displaced  · Heart is irregular in rhythm with normal S1, S2, 2/6 holosystolic murmur  · PMI is normal  · The carotid upstroke is normal, no bruit noted   · JVP is not elevated  · Peripheral pulses are symmetrical  · There is no clubbing, cyanosis of the extremities  ·  severe varicose veins and hyperpigmentation of the lower legs;  she usually wears compression stockings. ·  2+ LLE edema, no edema RLE  · Femoral Arteries: 2+ and equal without bruits  · Pedal Pulses: 2+ and equal   Abdomen:  · No masses or tenderness  · Aorta not palpable  · Normal bowel sounds  Neurological/Psychiatric:  · Alert and oriented x3  · Moves all extremities well  · Exhibits normal gait balance and coordination    Assessment:  1. Atherosclerosis of native coronary artery of native heart without angina pectoris: Stable without anginal symptoms  -Echo 12/11/18> Mild cLVH. Mildly decreased left ventricular systolic function with distal inferoseptal and apical akinesis. No evidence of clot or shunt. Hx of ventricular septum aneurysm and rupture repair. EF 40-45%. Mitral valve sclerosis with mild posterior mitral valve prolapse. Moderate mitral regurgitation with no pulmonary vein reversal. The left atrium is dilated. The aortic valve appears tricuspid with mild sclerosis no stenosis. Moderate aortic regurgitation is present. Moderate-severe tricuspid regurgitation. RVSP 45mmHg. The right atrium is severely dilated. Inferior vena cava appears dilated  -CABG 10/1988> repair of ventricular septal rupture. CABG w/ SVG-intermediate and OM branch and the posterolateral branch of the Cx, LAD is occluded. 2. Essential hypertension, benign: Stable  Discontinued aldactone due to K+ 5.5   3. Hyperlipidemia, unspecified hyperlipidemia type  Not taking Lipitor 10mg daily d/u elevated liver enzymes.  I would like her to restart Lipitor and will keep close eye on liver enzymes. Labs 11/13/21:  ; TRIG 53; HDL 75; LDL 37   4. Atrial fibrillation, now seems to be chronic with controlled ventricular response: HR irregular & controlled. Dr. Iva Brownlee regulates Coumadin, checks INR at home. 5.  Edema, h/o: from varicose veins, advised to continue to wear compression hose  6. SOB, h/o:  Multifactorial,  deconditioned    7. Elevated liver enzymes in the past: AST 44/ALT 15 on labs 11/13/21    8. Volume depletion, hx of. BUN/Cr 11/21 26.0.94      PLAN:   Will repeat an echo in [facility]. Advised to stay hydrated. FU 6 months    Scribe's attestation: This note was scribed in the presence of Dr Mae Jaffe MD by Mer Penaloza RN. The scribe's documentation has been prepared under my direction and personally reviewed by me in its entirety. I confirm that the note above accurately reflects all work, treatment, procedures, and medical decision making performed by me. Thank you for allowing to me to participate in the care of Gregoria Nagy.

## 2022-05-27 ENCOUNTER — PROCEDURE VISIT (OUTPATIENT)
Dept: CARDIOLOGY CLINIC | Age: 87
End: 2022-05-27
Payer: MEDICARE

## 2022-05-27 DIAGNOSIS — R53.83 OTHER FATIGUE: ICD-10-CM

## 2022-05-27 DIAGNOSIS — I50.42 SYSTOLIC AND DIASTOLIC CHF, CHRONIC (HCC): ICD-10-CM

## 2022-05-27 DIAGNOSIS — I10 ESSENTIAL HYPERTENSION: ICD-10-CM

## 2022-05-27 LAB
LV EF: 38 %
LVEF MODALITY: NORMAL

## 2022-05-27 PROCEDURE — 93306 TTE W/DOPPLER COMPLETE: CPT | Performed by: INTERNAL MEDICINE

## 2022-05-31 ENCOUNTER — TELEPHONE (OUTPATIENT)
Dept: CARDIOLOGY CLINIC | Age: 87
End: 2022-05-31

## 2022-05-31 DIAGNOSIS — I50.42 SYSTOLIC AND DIASTOLIC CHF, CHRONIC (HCC): Primary | ICD-10-CM

## 2022-05-31 DIAGNOSIS — R53.83 OTHER FATIGUE: ICD-10-CM

## 2022-05-31 NOTE — TELEPHONE ENCOUNTER
----- Message from Gilda Blake MD sent at 5/27/2022  5:25 PM EDT -----  Call pt echo is stable. EF is moderately reduced (same) and moderate aortic regurg.

## 2022-06-01 LAB
ALBUMIN SERPL-MCNC: 4.1 G/DL (ref 3.5–5)
ALP BLD-CCNC: 139 IU/L (ref 35–104)
ALT SERPL-CCNC: 15 IU/L (ref 10–35)
ANION GAP SERPL CALCULATED.3IONS-SCNC: 8 MMOL/L (ref 6–18)
AST SERPL-CCNC: 45 IU/L (ref 10–35)
BASOPHILS ABSOLUTE: 0.1 THOU/MCL (ref 0–0.2)
BASOPHILS ABSOLUTE: 1 %
BILIRUB SERPL-MCNC: 0.8 MG/DL (ref 0–1)
BUN BLDV-MCNC: 32 MG/DL (ref 8–26)
CALCIUM SERPL-MCNC: 9.5 MG/DL (ref 8.8–10.1)
CHLORIDE BLD-SCNC: 103 MEQ/L (ref 98–111)
CO2: 28 MMOL/L (ref 21–31)
CREAT SERPL-MCNC: 1.03 MG/DL (ref 0.44–1.03)
EGFR (CKD-EPI): 51 ML/MIN/1.73 M2
EOSINOPHILS ABSOLUTE: 0.4 THOU/MCL (ref 0.03–0.45)
EOSINOPHILS RELATIVE PERCENT: 8 %
GLUCOSE BLD-MCNC: 139 MG/DL (ref 70–99)
HCT VFR BLD CALC: 37.5 % (ref 36–46)
HEMOGLOBIN: 12.6 G/DL (ref 12–15.2)
LYMPHOCYTES ABSOLUTE: 1.1 THOU/MCL (ref 1–4)
LYMPHOCYTES RELATIVE PERCENT: 24 %
MCH RBC QN AUTO: 30.9 PG (ref 27–33)
MCHC RBC AUTO-ENTMCNC: 33.5 G/DL (ref 32–36)
MCV RBC AUTO: 92.3 FL (ref 82–97)
MONOCYTES # BLD: 12 %
MONOCYTES ABSOLUTE: 0.6 THOU/MCL (ref 0.2–0.9)
NEUTROPHILS ABSOLUTE: 2.7 THOU/MCL (ref 1.8–7.7)
PDW BLD-RTO: 16.7 %
PLATELET # BLD: 157 THOU/MCL (ref 140–375)
PMV BLD AUTO: 8.7 FL (ref 7.4–11.5)
POTASSIUM SERPL-SCNC: 4.4 MEQ/L (ref 3.6–5.1)
RBC # BLD: 4.06 MIL/MCL (ref 3.8–5.2)
SEG NEUTROPHILS: 55 %
SODIUM BLD-SCNC: 139 MEQ/L (ref 135–145)
TOTAL PROTEIN: 6.6 G/DL (ref 6.6–8.7)
WBC # BLD: 4.8 THOU/MCL (ref 3.6–10.5)

## 2022-06-02 ENCOUNTER — TELEPHONE (OUTPATIENT)
Dept: CARDIOLOGY CLINIC | Age: 87
End: 2022-06-02

## 2022-06-02 NOTE — TELEPHONE ENCOUNTER
Patient's  Edgar Johns called with blood pressure readings for [facility] ([place]). BP has been running 150-160/70, 80, 90 over last 3 1/2 days. Patient cannot come for her appointment today for a bp check. Please call patient and advise.   hermila

## 2022-06-02 NOTE — TELEPHONE ENCOUNTER
She should stop the losartan today and on Sunday am start 1/2 tab of Entresto bid. She should check her BP before each dose and not take it if the systolic BP is less than 420.   She needs to also take her BP at noon daily and record all of these BPs and report to us on Thursday June 9

## 2022-06-02 NOTE — TELEPHONE ENCOUNTER
I spoke w/ Jorge Luu and her  at length regarding medication changes per Dr Familia Valdovinos. They wrote all instructions down and repeated it to me. She will call on Thursday, June 9 with her b/p readings and an update on how she is doing on [facility]. She and her  have been encouraged to call with any questions/concerns. rx for Entresto 24/26mg 1/2 BID has been sent to [facility] in [facility].

## 2022-06-09 ENCOUNTER — TELEPHONE (OUTPATIENT)
Dept: CARDIOLOGY CLINIC | Age: 87
End: 2022-06-09

## 2022-06-09 NOTE — TELEPHONE ENCOUNTER
The Kori's came to the formerly [facility] office with blood pressure readings. Sb/p in am and pm 130-160 and at noon 110-130. Per Dr Kay>Ami will increase Entresto 24/26mg to 1 whole tab BID. She will continue to monitor b/p 3 times daily and record readings. She will continue to take [facility] as long as sb/p >100.

## 2022-07-14 ENCOUNTER — TELEPHONE (OUTPATIENT)
Dept: CARDIOLOGY CLINIC | Age: 87
End: 2022-07-14

## 2022-07-14 ENCOUNTER — APPOINTMENT (OUTPATIENT)
Dept: CARDIOLOGY | Facility: CLINIC | Age: 87
End: 2022-07-14

## 2022-07-14 ENCOUNTER — NON-APPOINTMENT (OUTPATIENT)
Age: 87
End: 2022-07-14

## 2022-07-14 VITALS
BODY MASS INDEX: 18.83 KG/M2 | OXYGEN SATURATION: 95 % | TEMPERATURE: 97.3 F | DIASTOLIC BLOOD PRESSURE: 74 MMHG | HEIGHT: 65 IN | WEIGHT: 113 LBS | HEART RATE: 72 BPM | SYSTOLIC BLOOD PRESSURE: 120 MMHG

## 2022-07-14 DIAGNOSIS — T14.8XXA OTHER INJURY OF UNSPECIFIED BODY REGION, INITIAL ENCOUNTER: ICD-10-CM

## 2022-07-14 DIAGNOSIS — I25.5 ISCHEMIC CARDIOMYOPATHY: ICD-10-CM

## 2022-07-14 PROCEDURE — 99214 OFFICE O/P EST MOD 30 MIN: CPT | Mod: 25

## 2022-07-14 PROCEDURE — 93000 ELECTROCARDIOGRAM COMPLETE: CPT

## 2022-07-14 RX ORDER — METOPROLOL SUCCINATE 25 MG/1
25 TABLET, EXTENDED RELEASE ORAL DAILY
Refills: 3 | Status: DISCONTINUED | COMMUNITY
Start: 2020-09-08 | End: 2022-07-14

## 2022-07-14 NOTE — TELEPHONE ENCOUNTER
I spoke w/ Ms Rosa Diazkathy and let her know I will relay this message to Dr Keesha Holder upon her return.  She is agreeable to this

## 2022-07-14 NOTE — ASSESSMENT
[FreeTextEntry1] : Bruising: The patient is maintained on anticoagulant therapy (Coumadin) because of atrial fibrillation.  She has cardiomyopathy with most recent ejection fraction between 30 and 40%.  She is not on aspirin therapy.  The patient underwent coronary artery bypass grafting because of a acute myocardial infarction complicated by VSD in 1988.\par \par The patient has a long history of mottling of the legs which may have been similar to what she has now.  Recently she developed significant lower extremity nuisance bleeding which is asymptomatic.  \par \par Recently the patient was started on Entresto which has resulted in excellent clinical results.  The patient has improved energy and walks fairly well without exertional symptoms.  The patient has been told for quite some time of depressed left ventricular function and most recently was told that the ejection fraction was between 30 and 40%.\par \par Nuisance bleeding: We will check complete metabolic panel and CBC with platelets.  Given her recent Entresto use will be certain that there is no evidence of renal embarrassment on phlebotomy.  If phlebotomy is entirely normal she can be observed for now.  We will not add aspirin therapy given the entirety of the situation.

## 2022-07-14 NOTE — TELEPHONE ENCOUNTER
Pt states she seen her cardiologist today and states he feels it is nuisance bleeding caused by age and is going to have pt do a blood work up on Monday.  You can call pt back at [phone]

## 2022-07-14 NOTE — TELEPHONE ENCOUNTER
Pt  called stating the pt started taking a new medication (ENTRESTO). Pt has red blotches on her legs they are wondering if it can be related to the ENTRESTO?  legs are not swollen, and they reeder not itch, there is red blotches and it getting worse by the day.     Pls advise thank you

## 2022-07-14 NOTE — HISTORY OF PRESENT ILLNESS
[FreeTextEntry1] : Bruising: The patient is maintained on anticoagulant therapy (Coumadin) because of atrial fibrillation.  She has cardiomyopathy with most recent ejection fraction between 30 and 40%.  She is not on aspirin therapy.  The patient underwent coronary artery bypass grafting because of a acute myocardial infarction complicated by VSD in 1988.\par \par The patient has a long history of mottling of the legs which may have been similar to what she has now.  Recently she developed significant lower extremity nuisance bleeding.\par \par Recently the patient was started on Entresto which has resulted in excellent clinical results.  The patient has improved energy and walks fairly well without exertional symptoms.  The patient has been told for quite some time of depressed left ventricular function and most recently was told that the ejection fraction was between 30 and 40%.

## 2022-07-22 RX ORDER — ATORVASTATIN CALCIUM 10 MG/1
10 TABLET, FILM COATED ORAL DAILY
Qty: 90 | Refills: 1 | Status: ACTIVE | COMMUNITY
Start: 2022-07-22

## 2022-07-22 RX ORDER — LOSARTAN POTASSIUM 25 MG/1
25 TABLET, FILM COATED ORAL DAILY
Qty: 90 | Refills: 3 | Status: ACTIVE | COMMUNITY
Start: 2022-07-22

## 2022-08-04 ENCOUNTER — APPOINTMENT (OUTPATIENT)
Dept: CARDIOLOGY | Facility: CLINIC | Age: 87
End: 2022-08-04

## 2022-09-07 ENCOUNTER — TELEPHONE (OUTPATIENT)
Dept: CARDIOLOGY CLINIC | Age: 87
End: 2022-09-07

## 2022-09-07 NOTE — TELEPHONE ENCOUNTER
Nestor Brett called to verify if she should be taking Entresto QD or BID. She has a paper that has handwritten on it to take QD, but the bottle is for BID. She states she has been taking it BID. I clarified with her that [facility] is 24/26mg 1 tab BID.

## 2022-09-14 ENCOUNTER — TELEPHONE (OUTPATIENT)
Dept: CARDIOLOGY CLINIC | Age: 87
End: 2022-09-14

## 2022-09-14 NOTE — TELEPHONE ENCOUNTER
Pt states her current weight is 112. States she is on entresto and is asking if she should continue taking it. Please call to advise.

## 2022-09-14 NOTE — TELEPHONE ENCOUNTER
Called pt. She has lost weight and is down to 110lbs, she states she is not eating enough. Her  is in the hospital and she is under a lot of stress. She has not checked her bp recently, last time she checked it sb/p was 135. She just wanted to know if she should refill entresto which she will. I advised her to monitor b/p and call if low, also recommended she drink ensure or boost. I let her know I will notify Dr Siva Lomeli of what is going on with her and her .

## 2022-09-27 ENCOUNTER — OFFICE VISIT (OUTPATIENT)
Dept: CARDIOLOGY CLINIC | Age: 87
End: 2022-09-27
Payer: MEDICARE

## 2022-09-27 VITALS
DIASTOLIC BLOOD PRESSURE: 50 MMHG | BODY MASS INDEX: 18.99 KG/M2 | HEIGHT: 65 IN | OXYGEN SATURATION: 97 % | HEART RATE: 57 BPM | SYSTOLIC BLOOD PRESSURE: 104 MMHG | WEIGHT: 114 LBS

## 2022-09-27 DIAGNOSIS — I50.42 SYSTOLIC AND DIASTOLIC CHF, CHRONIC (HCC): Primary | ICD-10-CM

## 2022-09-27 DIAGNOSIS — I25.10 CORONARY ARTERY DISEASE INVOLVING NATIVE CORONARY ARTERY OF NATIVE HEART WITHOUT ANGINA PECTORIS: ICD-10-CM

## 2022-09-27 DIAGNOSIS — I25.5 ISCHEMIC CARDIOMYOPATHY: ICD-10-CM

## 2022-09-27 DIAGNOSIS — I10 ESSENTIAL HYPERTENSION: ICD-10-CM

## 2022-09-27 PROCEDURE — 99214 OFFICE O/P EST MOD 30 MIN: CPT | Performed by: INTERNAL MEDICINE

## 2022-09-27 PROCEDURE — 1123F ACP DISCUSS/DSCN MKR DOCD: CPT | Performed by: INTERNAL MEDICINE

## 2022-09-27 RX ORDER — TORSEMIDE 10 MG/1
5 TABLET ORAL DAILY
Qty: 45 TABLET | Refills: 3 | Status: SHIPPED | OUTPATIENT
Start: 2022-09-27

## 2022-09-27 NOTE — PROGRESS NOTES
[address]   Cardiac Evaluation      Patient: Jesica Lindsey  YOB: 1931  Date: 9/27/22     Chief Complaint   Patient presents with    Atrial Fibrillation    Cardiomyopathy      Referring provider: Saul Valencia MD    History of Present Illness:   Mrs. 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 She had repeat LHC in 2007 that revealed patent grafts. She is on Coumadin for atrial fibrillation. She has a known cmp with moderate valvular disease and RVSP of 45. She has had complaints of dry mouth and dizziness in the past.      Today, she is here with her . She has been under a lot of stress with her  being ill. Barb Rosales has lost weight and is not eating as much as she should. She has b/p and HR readings with her. She denies chest pain, palpitations, dizziness, or edema. She sleeps in 4 hour increments at night. Past Medical History:   has a past medical history of AF (atrial fibrillation) ([address] CAD (coronary artery disease), Hyperlipidemia, Hypertension, and Valvular heart disease. Surgical History:   has a past surgical history that includes Coronary artery bypass graft and Diagnostic Cardiac Cath Lab Procedure. Current Outpatient Medications   Medication Sig Dispense Refill    GLUCOSAMINE CHONDROITIN COMPLX PO Take by mouth      sacubitril-valsartan (ENTRESTO) 24-26 MG per tablet Take 1 tablet by mouth 2 times daily 180 tablet 3    Multiple Vitamins-Minerals (ICAPS AREDS 2 PO) Take by mouth      atorvastatin (LIPITOR) 10 MG tablet Take 1 tablet by mouth daily 90 tablet 3    torsemide (DEMADEX) 10 MG tablet TAKE 1 TABLET DAILY (Patient taking differently: 1/2 tab) 90 tablet 3    warfarin (COUMADIN) 2 MG tablet Take 2 mg by mouth daily      oxybutynin (DITROPAN) 5 MG tablet Take 5 mg by mouth daily.       co-enzyme Q-10 30 MG capsule Take 100 mg by mouth daily       levothyroxine (SYNTHROID) 75 MCG tablet Take 75 mcg by mouth daily. vitamin B-12 (CYANOCOBALAMIN) 500 MCG tablet Take 1,000 mcg by mouth daily       folic acid (FOLVITE) 1 MG tablet Take 400 mcg by mouth daily        No current facility-administered medications for this visit. Social History:  Social History     Socioeconomic History    Marital status:      Spouse name: Not on file    Number of children: Not on file    Years of education: Not on file    Highest education level: Not on file   Occupational History    Not on file   Tobacco Use    Smoking status: Former    Smokeless tobacco: Never    Tobacco comments:     quit 20 years   Vaping Use    Vaping Use: Never used   Substance and Sexual Activity    Alcohol use: Yes     Comment: minimal/1 glass of wine each night    Drug use: No    Sexual activity: Not on file   Other Topics Concern    Not on file   Social History Narrative    Not on file     Social Determinants of Health     Financial Resource Strain: Not on file   Food Insecurity: Not on file   Transportation Needs: Not on file   Physical Activity: Not on file   Stress: Not on file   Social Connections: Not on file   Intimate Partner Violence: Not on file   Housing Stability: Not on file     Family History:  family history includes Cancer in her father and sister; Other in her mother. Allergies:  Levaquin [levofloxacin in d5w] and Spironolactone     Review of Systems:   Constitutional: there has been no unanticipated weight loss. + fatigue   Eyes: No visual changes   ENT: No Headaches, hearing loss or vertigo. No mouth sores or sore throat. Cardiovascular: Reviewed in HPI  Respiratory: No cough or wheezing, no sputum production. Gastrointestinal: No abdominal pain, appetite loss, blood in stools. No change in bowel or bladder habits. Genitourinary: No nocturia, dysuria, trouble voiding  Musculoskeletal:  No gait disturbance, weakness  Integumentary: No rash or pruritis.   Neurological: No headache, change in muscle strength, numbness or tingling. No change in gait, balance, coordination, mood, affect, memory, mentation, behavior. Psychiatric: No anxiety or depression  Endocrine: No malaise or fever  Hematologic/Lymphatic: No abnormal bruising or bleeding, blood clots or swollen lymph nodes. Allergic/Immunologic: No nasal congestion or hives. Physical Examination:    Vitals:    09/27/22 1105 09/27/22 1111   BP: (!) 104/52 (!) 104/50   Site: Left Upper Arm Left Upper Arm   Position: Sitting Sitting   Cuff Size: Medium Adult Medium Adult   Pulse: 57    SpO2: 97%    Weight: 114 lb (51.7 kg)    Height: 5' 5\" (1.651 m)      Body mass index is 18.97 kg/m². Wt Readings from Last 3 Encounters:   09/27/22 114 lb (51.7 kg)   05/16/22 124 lb (56.2 kg)   10/13/21 120 lb (54.4 kg)      BP Readings from Last 3 Encounters:   09/27/22 (!) 104/50   05/16/22 130/60   10/13/21 110/64      Constitutional and General Appearance:  appears stated age, frail  Eyes - no xanthelasma  Respiratory:  Normal excursion and expansion without use of accessory muscles  Resp Auscultation: Normal breath sounds without dullness  Cardiovascular: The apical impulses not displaced  Heart is irregular in rhythm with normal S1, S2, 2/6 holosystolic murmur  PMI is normal  The carotid upstroke is normal, no bruit noted   JVP is not elevated  Peripheral pulses are symmetrical  There is no clubbing, cyanosis of the extremities   severe varicose veins and hyperpigmentation of the lower legs;  she usually wears compression stockings. 2+ LLE edema, no edema RLE  Femoral Arteries: 2+ and equal without bruits  Pedal Pulses: 2+ and equal   Abdomen:  No masses or tenderness  Aorta not palpable  Normal bowel sounds  Neurological/Psychiatric:  Alert and oriented x3  Moves all extremities well  Exhibits normal gait balance and coordination    Assessment:  1.  Atherosclerosis of native coronary artery of native heart without angina pectoris: Stable without anginal symptoms  -Echo 5/27/22> Normal left ventricular size. Mild concentric left ventricular hypertrophy. Decreased left ventricular systolic function with mid to distal inferoseptal and apical hypokinesis. No evidence of apical clots. Contrast unavailable. History of ventricular septum aneurysm and rupture repair. No evidence of shunting. Estimated EF 35-40% E/e'=13. Diastolic dysfunction. Mild mitral valve prolapse. Moderate mitral regurgitation. The left atrium is severely dilated. The aortic valve appears tricuspid with mild sclerosis no stenosis. Moderate aortic regurgitation is present. Pressure half-time is calculated at 559 msec. The right ventricle is mildly enlarged and mildly decreased in function. Severe tricuspid regurgitation. RVSP 38mmHg. The right atrium is severely dilated. Mild pulmonic regurgitation. IVC size is normal (<2.1 cm) but collapses < 50% with respiration consistent with elevated RA pressure (8 mmHg). -Echo 12/11/18> Mild cLVH. Mildly decreased left ventricular systolic function with distal inferoseptal and apical akinesis. No evidence of clot or shunt. Hx of ventricular septum aneurysm and rupture repair. EF 40-45%. Mitral valve sclerosis with mild posterior mitral valve prolapse. Moderate mitral regurgitation with no pulmonary vein reversal. The left atrium is dilated. The aortic valve appears tricuspid with mild sclerosis no stenosis. Moderate aortic regurgitation is present. Moderate-severe tricuspid regurgitation. RVSP 45mmHg. The right atrium is severely dilated. Inferior vena cava appears dilated  -CABG 10/1988> repair of ventricular septal rupture. CABG w/ SVG-intermediate and OM branch and the posterolateral branch of the Cx, LAD is occluded. 2. Essential hypertension, benign: Stable  Discontinued aldactone due to K+ 5.5   3. Hyperlipidemia, unspecified hyperlipidemia type  Not taking Lipitor 10mg daily d/u elevated liver enzymes.  I would like her to restart Lipitor and will keep close eye on liver enzymes. Labs 11/13/21:  ; TRIG 53; HDL 75; LDL 37   4. Atrial fibrillation, now seems to be chronic with controlled ventricular response: HR irregular & controlled. Dr. Ira Amado regulates Coumadin, checks INR at home. 5.  Edema, h/o: from varicose veins, advised to continue to wear compression hose  6. SOB, h/o:  Multifactorial,  deconditioned    7. Elevated liver enzymes in the past: AST 44/ALT 15 on labs 11/13/21    8. Volume depletion, hx of. BUN/Cr 32/1.03 on labs 6/1/22      PLAN:   Will take Torsemide 5mg daily. Continue to monitor b/p and HR. She can take one whole tylenol pm to help her sleep at night. She needs to eat more. Scribe's attestation: This note was scribed in the presence of Dr Aubrie Terry MD by Emily Hunter RN. The scribe's documentation has been prepared under my direction and personally reviewed by me in its entirety. I confirm that the note above accurately reflects all work, treatment, procedures, and medical decision making performed by me. Thank you for allowing to me to participate in the care of Kathleen Mane.

## 2022-11-07 NOTE — PROGRESS NOTES
[facility]   Cardiac Evaluation      Patient: Toma Manuel  YOB: 1931  Date: 11/11/22     Chief Complaint   Patient presents with    Cardiomyopathy    Atrial Fibrillation    Coronary Artery Disease        Referring provider: Ann Marie Torres MD    History of Present Illness:   Mrs. 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 She had repeat C in 2007 that revealed patent grafts. She is on Coumadin for atrial fibrillation. She has a known cmp with moderate valvular disease and RVSP of 45. She has had complaints of dry mouth and dizziness in the past.      Today, she is here with her . Her b/p has been under good control. Clara Davis states Dr Lilia Tobar increased Torsemide to 10mg daily for left leg swelling. She is tolerating this well, denies any dizziness. She does not have any chest pain, palpitations, or edema. Past Medical History:   has a past medical history of AF (atrial fibrillation) ([address] CAD (coronary artery disease), Hyperlipidemia, Hypertension, and Valvular heart disease. Surgical History:   has a past surgical history that includes Coronary artery bypass graft and Diagnostic Cardiac Cath Lab Procedure. Current Outpatient Medications   Medication Sig Dispense Refill    fluorouracil (EFUDEX) 5 % cream SMARTSIG:Topical      GLUCOSAMINE CHONDROITIN COMPLX PO Take by mouth      torsemide (DEMADEX) 10 MG tablet Take 0.5 tablets by mouth daily 45 tablet 3    sacubitril-valsartan (ENTRESTO) 24-26 MG per tablet Take 1 tablet by mouth 2 times daily 180 tablet 3    Multiple Vitamins-Minerals (ICAPS AREDS 2 PO) Take by mouth      atorvastatin (LIPITOR) 10 MG tablet Take 1 tablet by mouth daily 90 tablet 3    warfarin (COUMADIN) 2 MG tablet Take 2 mg by mouth daily      oxybutynin (DITROPAN) 5 MG tablet Take 5 mg by mouth daily.       co-enzyme Q-10 30 MG capsule Take 100 mg by mouth daily levothyroxine (SYNTHROID) 75 MCG tablet Take 75 mcg by mouth daily. vitamin B-12 (CYANOCOBALAMIN) 500 MCG tablet Take 1,000 mcg by mouth daily       folic acid (FOLVITE) 1 MG tablet Take 400 mcg by mouth daily        No current facility-administered medications for this visit. Social History:  Social History     Socioeconomic History    Marital status:      Spouse name: Not on file    Number of children: Not on file    Years of education: Not on file    Highest education level: Not on file   Occupational History    Not on file   Tobacco Use    Smoking status: Former    Smokeless tobacco: Never    Tobacco comments:     quit 20 years   Vaping Use    Vaping Use: Never used   Substance and Sexual Activity    Alcohol use: Yes     Comment: minimal/1 glass of wine each night    Drug use: No    Sexual activity: Not on file   Other Topics Concern    Not on file   Social History Narrative    Not on file     Social Determinants of Health     Financial Resource Strain: Not on file   Food Insecurity: Not on file   Transportation Needs: Not on file   Physical Activity: Not on file   Stress: Not on file   Social Connections: Not on file   Intimate Partner Violence: Not on file   Housing Stability: Not on file     Family History:  family history includes Cancer in her father and sister; Other in her mother. Allergies:  Levaquin [levofloxacin in d5w] and Spironolactone     Review of Systems:   Constitutional: there has been no unanticipated weight loss. Eyes: No visual changes   ENT: No Headaches, hearing loss or vertigo. No mouth sores or sore throat. Cardiovascular: Reviewed in HPI  Respiratory: No cough or wheezing, no sputum production. Gastrointestinal: No abdominal pain, appetite loss, blood in stools. No change in bowel or bladder habits. Genitourinary: No nocturia, dysuria, trouble voiding  Musculoskeletal:  No gait disturbance, weakness  Integumentary: No rash or pruritis.   Neurological: No headache, change in muscle strength, numbness or tingling. No change in gait, balance, coordination, mood, affect, memory, mentation, behavior. Psychiatric: No anxiety or depression  Endocrine: No malaise or fever  Hematologic/Lymphatic: No abnormal bruising or bleeding, blood clots or swollen lymph nodes. Allergic/Immunologic: No nasal congestion or hives. Physical Examination:    Vitals:    11/11/22 0934   BP: 118/62   Site: Left Upper Arm   Position: Sitting   Cuff Size: Medium Adult   Pulse: 60   Weight: 118 lb (53.5 kg)   Height: 5' 5\" (1.651 m)       Body mass index is 19.64 kg/m². Wt Readings from Last 3 Encounters:   11/11/22 118 lb (53.5 kg)   09/27/22 114 lb (51.7 kg)   05/16/22 124 lb (56.2 kg)      BP Readings from Last 3 Encounters:   11/11/22 118/62   09/27/22 (!) 104/50   05/16/22 130/60      Constitutional and General Appearance:  appears stated age, frail  Eyes - no xanthelasma  Respiratory:  Normal excursion and expansion without use of accessory muscles  Resp Auscultation: Normal breath sounds without dullness  Cardiovascular: The apical impulses not displaced  Heart is irregular in rhythm with normal S1, S2, 2/6 holosystolic murmur  PMI is normal  The carotid upstroke is normal, no bruit noted   JVP is not elevated  Peripheral pulses are symmetrical  There is no clubbing, cyanosis of the extremities   severe varicose veins and hyperpigmentation of the lower legs;  she usually wears compression stockings. no edema BLE, some ulcerated areas covered in bandages. Femoral Arteries: 2+ and equal without bruits  Pedal Pulses: 2+ and equal   Abdomen:  No masses or tenderness  Aorta not palpable  Normal bowel sounds  Neurological/Psychiatric:  Alert and oriented x3  Moves all extremities well  Exhibits normal gait balance and coordination    Assessment:  1.  Atherosclerosis of native coronary artery of native heart without angina pectoris: Stable without anginal symptoms  -Echo 5/27/22> Normal left ventricular size. Mild concentric left ventricular hypertrophy. Decreased left ventricular systolic function with mid to distal inferoseptal and apical hypokinesis. No evidence of apical clots. Contrast unavailable. History of ventricular septum aneurysm and rupture repair. No evidence of shunting. Estimated EF 35-40% E/e'=13. Diastolic dysfunction. Mild mitral valve prolapse. Moderate mitral regurgitation. The left atrium is severely dilated. The aortic valve appears tricuspid with mild sclerosis no stenosis. Moderate aortic regurgitation is present. Pressure half-time is calculated at 559 msec. The right ventricle is mildly enlarged and mildly decreased in function. Severe tricuspid regurgitation. RVSP 38mmHg. The right atrium is severely dilated. Mild pulmonic regurgitation. IVC size is normal (<2.1 cm) but collapses < 50% with respiration consistent with elevated RA pressure (8 mmHg). -Echo 12/11/18> Mild cLVH. Mildly decreased left ventricular systolic function with distal inferoseptal and apical akinesis. No evidence of clot or shunt. Hx of ventricular septum aneurysm and rupture repair. EF 40-45%. Mitral valve sclerosis with mild posterior mitral valve prolapse. Moderate mitral regurgitation with no pulmonary vein reversal. The left atrium is dilated. The aortic valve appears tricuspid with mild sclerosis no stenosis. Moderate aortic regurgitation is present. Moderate-severe tricuspid regurgitation. RVSP 45mmHg. The right atrium is severely dilated. Inferior vena cava appears dilated  -CABG 10/1988> repair of ventricular septal rupture. CABG w/ SVG-intermediate and OM branch and the posterolateral branch of the Cx, LAD is occluded. 2. Essential hypertension, benign: Stable  Discontinued aldactone due to K+ 5.5   3. Hyperlipidemia, unspecified hyperlipidemia type  Not taking Lipitor 10mg daily d/u elevated liver enzymes.  I would like her to restart Lipitor and will keep close eye on liver enzymes. Labs 11/13/21:  ; TRIG 53; HDL 75; LDL 37   4. Atrial fibrillation, now seems to be chronic with controlled ventricular response: HR irregular & controlled. Dr. Kae Chappell regulates Coumadin, checks INR at home. 5.  Edema, h/o: from varicose veins, advised to continue to wear compression hose  6. SOB, h/o:  Multifactorial,  deconditioned    7. Elevated liver enzymes in the past: AST 44/ALT 15 on labs 11/13/21    8. Volume depletion, hx of. BUN/Cr 32/1.03 on labs 6/1/22  She is taking Torsemide 10mg daily per Dr Kae Chappell      PLAN:    Ms Love Rodrigez appears stable. No med changes. FU in 6 months. Scribe's attestation: This note was scribed in the presence of Dr Noni Green MD by Yoly Lyn RN. The scribe's documentation has been prepared under my direction and personally reviewed by me in its entirety. I confirm that the note above accurately reflects all work, treatment, procedures, and medical decision making performed by me. Thank you for allowing to me to participate in the care of Lena Simms.

## 2022-11-08 ENCOUNTER — TELEPHONE (OUTPATIENT)
Dept: CARDIOLOGY CLINIC | Age: 87
End: 2022-11-08

## 2022-11-08 NOTE — TELEPHONE ENCOUNTER
state he has an appt on 11/11/22 at [place] [id number] and states he has a list of pt's BP pulse readings. Asking if he can bring those reading in with his appt or does pt need an appt along with his. Please call to advise.

## 2022-11-11 ENCOUNTER — OFFICE VISIT (OUTPATIENT)
Dept: CARDIOLOGY CLINIC | Age: 87
End: 2022-11-11
Payer: MEDICARE

## 2022-11-11 VITALS
HEART RATE: 60 BPM | BODY MASS INDEX: 19.66 KG/M2 | DIASTOLIC BLOOD PRESSURE: 62 MMHG | SYSTOLIC BLOOD PRESSURE: 118 MMHG | HEIGHT: 65 IN | WEIGHT: 118 LBS

## 2022-11-11 DIAGNOSIS — I50.42 SYSTOLIC AND DIASTOLIC CHF, CHRONIC (HCC): ICD-10-CM

## 2022-11-11 DIAGNOSIS — I25.10 CORONARY ARTERY DISEASE INVOLVING NATIVE CORONARY ARTERY OF NATIVE HEART WITHOUT ANGINA PECTORIS: ICD-10-CM

## 2022-11-11 DIAGNOSIS — I10 ESSENTIAL HYPERTENSION: ICD-10-CM

## 2022-11-11 DIAGNOSIS — E78.5 HYPERLIPIDEMIA, UNSPECIFIED HYPERLIPIDEMIA TYPE: ICD-10-CM

## 2022-11-11 DIAGNOSIS — I48.20 CHRONIC ATRIAL FIBRILLATION (HCC): Primary | ICD-10-CM

## 2022-11-11 PROCEDURE — 1123F ACP DISCUSS/DSCN MKR DOCD: CPT | Performed by: INTERNAL MEDICINE

## 2022-11-11 PROCEDURE — 99214 OFFICE O/P EST MOD 30 MIN: CPT | Performed by: INTERNAL MEDICINE

## 2022-11-11 RX ORDER — TORSEMIDE 10 MG/1
10 TABLET ORAL DAILY
Qty: 90 TABLET | Refills: 3
Start: 2022-11-11

## 2022-11-11 RX ORDER — FLUOROURACIL 50 MG/G
CREAM TOPICAL
COMMUNITY
Start: 2022-08-25

## 2022-12-30 NOTE — PROGRESS NOTES
[address]   Cardiac Evaluation      Patient: Oleh Apley  YOB: 1931  Date: 1/3/23     Chief Complaint   Patient presents with    Atrial Fibrillation    Coronary Artery Disease    Cardiomyopathy          Referring provider: Kyra Mendoza MD    History of Present Illness:   Mrs. 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 She had repeat LHC in 2007 that revealed patent grafts. She is on Coumadin for atrial fibrillation. She has a known cmp with moderate valvular disease and RVSP of 45. She has had complaints of dry mouth and dizziness in the past.      Today, she is here with her  and daughter. They are leaving for [place] at the end of the week. She has been feeling fine. Maru Fleeting denies chest pain, palpitations, STOKES, dizziness. She has mild LE edema. Past Medical History:   has a past medical history of AF (atrial fibrillation) ([address] CAD (coronary artery disease), Hyperlipidemia, Hypertension, and Valvular heart disease. Surgical History:   has a past surgical history that includes Coronary artery bypass graft and Diagnostic Cardiac Cath Lab Procedure. Current Outpatient Medications   Medication Sig Dispense Refill    torsemide (DEMADEX) 10 MG tablet Take 1 tablet by mouth daily 90 tablet 3    GLUCOSAMINE CHONDROITIN COMPLX PO Take by mouth      sacubitril-valsartan (ENTRESTO) 24-26 MG per tablet Take 1 tablet by mouth 2 times daily 180 tablet 3    Multiple Vitamins-Minerals (ICAPS AREDS 2 PO) Take by mouth      atorvastatin (LIPITOR) 10 MG tablet Take 1 tablet by mouth daily 90 tablet 3    warfarin (COUMADIN) 2 MG tablet Take 2 mg by mouth daily      oxybutynin (DITROPAN) 5 MG tablet Take 5 mg by mouth daily. co-enzyme Q-10 30 MG capsule Take 100 mg by mouth daily       levothyroxine (SYNTHROID) 75 MCG tablet Take 75 mcg by mouth daily.         vitamin B-12 (CYANOCOBALAMIN) 500 MCG tablet Take 1,000 mcg by mouth daily       folic acid (FOLVITE) 1 MG tablet Take 400 mcg by mouth daily       fluorouracil (EFUDEX) 5 % cream SMARTSIG:Topical       No current facility-administered medications for this visit. Social History:  Social History     Socioeconomic History    Marital status:      Spouse name: Not on file    Number of children: Not on file    Years of education: Not on file    Highest education level: Not on file   Occupational History    Not on file   Tobacco Use    Smoking status: Former    Smokeless tobacco: Never    Tobacco comments:     quit 20 years   Vaping Use    Vaping Use: Never used   Substance and Sexual Activity    Alcohol use: Yes     Comment: minimal/1 glass of wine each night    Drug use: No    Sexual activity: Not on file   Other Topics Concern    Not on file   Social History Narrative    Not on file     Social Determinants of Health     Financial Resource Strain: Not on file   Food Insecurity: Not on file   Transportation Needs: Not on file   Physical Activity: Not on file   Stress: Not on file   Social Connections: Not on file   Intimate Partner Violence: Not on file   Housing Stability: Not on file     Family History:  family history includes Cancer in her father and sister; Other in her mother. Allergies:  Levaquin [levofloxacin in d5w] and Spironolactone     Review of Systems:   Constitutional: there has been no unanticipated weight loss. Eyes: No visual changes   ENT: No Headaches, hearing loss or vertigo. No mouth sores or sore throat. Cardiovascular: Reviewed in HPI  Respiratory: No cough or wheezing, no sputum production. Gastrointestinal: No abdominal pain, appetite loss, blood in stools. No change in bowel or bladder habits. Genitourinary: No nocturia, dysuria, trouble voiding  Musculoskeletal:  No gait disturbance, weakness  Integumentary: No rash or pruritis. Neurological: No headache, change in muscle strength, numbness or tingling.  No change in gait, balance, coordination, mood, affect, memory, mentation, behavior. Psychiatric: No anxiety or depression  Endocrine: No malaise or fever  Hematologic/Lymphatic: No abnormal bruising or bleeding, blood clots or swollen lymph nodes. Allergic/Immunologic: No nasal congestion or hives. Physical Examination:    Vitals:    01/03/23 1306   BP: 122/62   Site: Left Upper Arm   Position: Sitting   Cuff Size: Medium Adult   Pulse: 58   SpO2: 97%   Weight: 121 lb (54.9 kg)   Height: 5' 5\" (1.651 m)         Body mass index is 20.14 kg/m². Wt Readings from Last 3 Encounters:   01/03/23 121 lb (54.9 kg)   11/11/22 118 lb (53.5 kg)   09/27/22 114 lb (51.7 kg)      BP Readings from Last 3 Encounters:   01/03/23 122/62   11/11/22 118/62   09/27/22 (!) 104/50      Constitutional and General Appearance:  appears stated age, frail  Eyes - no xanthelasma  Respiratory:  Normal excursion and expansion without use of accessory muscles  Resp Auscultation: Normal breath sounds without dullness  Cardiovascular: The apical impulses not displaced  Heart is irregular in rhythm with normal S1, S2, 2/6 holosystolic murmur  PMI is normal  The carotid upstroke is normal, no bruit noted   JVP is not elevated  Peripheral pulses are symmetrical  There is no clubbing, cyanosis of the extremities   severe varicose veins and hyperpigmentation of the lower legs;  she usually wears compression stockings. 1+ edema LLE, some ulcerated areas covered in bandages. Femoral Arteries: 2+ and equal without bruits  Pedal Pulses: 2+ and equal   Abdomen:  No masses or tenderness  Aorta not palpable  Normal bowel sounds  Neurological/Psychiatric:  Alert and oriented x3  Moves all extremities well  Exhibits normal gait balance and coordination    Assessment:  1. Atherosclerosis of native coronary artery of native heart without angina pectoris: Stable without anginal symptoms  -Echo 5/27/22> Normal left ventricular size.  Mild concentric left ventricular hypertrophy. Decreased left ventricular systolic function with mid to distal inferoseptal and apical hypokinesis. No evidence of apical clots. Contrast unavailable. History of ventricular septum aneurysm and rupture repair. No evidence of shunting. Estimated EF 35-40% E/e'=13. Diastolic dysfunction. Mild mitral valve prolapse. Moderate mitral regurgitation. The left atrium is severely dilated. The aortic valve appears tricuspid with mild sclerosis no stenosis. Moderate aortic regurgitation is present. Pressure half-time is calculated at 559 msec. The right ventricle is mildly enlarged and mildly decreased in function. Severe tricuspid regurgitation. RVSP 38mmHg. The right atrium is severely dilated. Mild pulmonic regurgitation. IVC size is normal (<2.1 cm) but collapses < 50% with respiration consistent with elevated RA pressure (8 mmHg). -Echo 12/11/18> Mild cLVH. Mildly decreased left ventricular systolic function with distal inferoseptal and apical akinesis. No evidence of clot or shunt. Hx of ventricular septum aneurysm and rupture repair. EF 40-45%. Mitral valve sclerosis with mild posterior mitral valve prolapse. Moderate mitral regurgitation with no pulmonary vein reversal. The left atrium is dilated. The aortic valve appears tricuspid with mild sclerosis no stenosis. Moderate aortic regurgitation is present. Moderate-severe tricuspid regurgitation. RVSP 45mmHg. The right atrium is severely dilated. Inferior vena cava appears dilated  -CABG 10/1988> repair of ventricular septal rupture. CABG w/ SVG-intermediate and OM branch and the posterolateral branch of the Cx, LAD is occluded. 2. Essential hypertension, benign: Stable  Discontinued aldactone due to K+ 5.5   3. Hyperlipidemia, unspecified hyperlipidemia type  Not taking Lipitor 10mg daily d/u elevated liver enzymes. I would like her to restart Lipitor and will keep close eye on liver enzymes.   Labs 11/13/21:  ; TRIG 53; HDL 75; LDL 37   4. Atrial fibrillation, now seems to be chronic with controlled ventricular response: HR irregular & controlled. Dr. Wilber Del Rio regulates Coumadin, checks INR at home. 5.  Edema, h/o: from varicose veins, advised to continue to wear compression hose  6. SOB, h/o:  Multifactorial,  deconditioned    7. Elevated liver enzymes in the past: AST 44/ALT 15 on labs 11/13/21    8. Volume depletion, hx of. BUN/Cr 32/1.03 on labs 6/1/22  She is taking Torsemide 10mg daily per Dr Evelyn Thakkar:  Repeat CMP. No med changes. She has been instructed to check b/p at home. Scribe's attestation: This note was scribed in the presence of Dr Jimmy Staton MD by Lindy Dean RN. Thank you for allowing to me to participate in the care of Janeth Goldsmith.

## 2023-01-03 ENCOUNTER — OFFICE VISIT (OUTPATIENT)
Dept: CARDIOLOGY CLINIC | Age: 88
End: 2023-01-03
Payer: MEDICARE

## 2023-01-03 VITALS
DIASTOLIC BLOOD PRESSURE: 62 MMHG | OXYGEN SATURATION: 97 % | BODY MASS INDEX: 20.16 KG/M2 | WEIGHT: 121 LBS | HEART RATE: 58 BPM | SYSTOLIC BLOOD PRESSURE: 122 MMHG | HEIGHT: 65 IN

## 2023-01-03 DIAGNOSIS — E78.5 HYPERLIPIDEMIA, UNSPECIFIED HYPERLIPIDEMIA TYPE: ICD-10-CM

## 2023-01-03 DIAGNOSIS — I25.5 ISCHEMIC CARDIOMYOPATHY: ICD-10-CM

## 2023-01-03 DIAGNOSIS — I10 ESSENTIAL HYPERTENSION: ICD-10-CM

## 2023-01-03 DIAGNOSIS — I48.20 CHRONIC ATRIAL FIBRILLATION (HCC): Primary | ICD-10-CM

## 2023-01-03 DIAGNOSIS — I25.10 CORONARY ARTERY DISEASE INVOLVING NATIVE CORONARY ARTERY OF NATIVE HEART WITHOUT ANGINA PECTORIS: ICD-10-CM

## 2023-01-03 LAB
ALBUMIN SERPL-MCNC: 4.1 G/DL (ref 3.5–5)
ALP BLD-CCNC: 119 IU/L (ref 35–104)
ALT SERPL-CCNC: 11 IU/L (ref 10–35)
ANION GAP SERPL CALCULATED.3IONS-SCNC: 8 MMOL/L (ref 6–18)
AST SERPL-CCNC: 31 IU/L (ref 10–35)
BILIRUB SERPL-MCNC: 0.6 MG/DL (ref 0–1)
BUN BLDV-MCNC: 29 MG/DL (ref 8–26)
CALCIUM SERPL-MCNC: 9.9 MG/DL (ref 8.2–9.6)
CHLORIDE BLD-SCNC: 106 MEQ/L (ref 98–111)
CO2: 31 MMOL/L (ref 21–31)
CREAT SERPL-MCNC: 1.08 MG/DL (ref 0.44–1.03)
EGFR (CKD-EPI): 48 ML/MIN/1.73 M2
GLUCOSE BLD-MCNC: 93 MG/DL (ref 70–99)
POTASSIUM SERPL-SCNC: 4.5 MEQ/L (ref 3.6–5.1)
SODIUM BLD-SCNC: 145 MEQ/L (ref 135–145)
TOTAL PROTEIN: 7.2 G/DL (ref 6.6–8.7)

## 2023-01-03 PROCEDURE — 99214 OFFICE O/P EST MOD 30 MIN: CPT | Performed by: INTERNAL MEDICINE

## 2023-01-03 PROCEDURE — 1123F ACP DISCUSS/DSCN MKR DOCD: CPT | Performed by: INTERNAL MEDICINE

## 2023-01-03 RX ORDER — TORSEMIDE 10 MG/1
10 TABLET ORAL DAILY
Qty: 90 TABLET | Refills: 3 | Status: SHIPPED | OUTPATIENT
Start: 2023-01-03

## 2023-04-25 NOTE — PROGRESS NOTES
[address]   Cardiac Evaluation      Patient: Mariah Oates  YOB: 1931  Date: 4/27/23     Chief Complaint   Patient presents with    Cardiomyopathy    Hypertension    Hyperlipidemia          Referring provider: Wilner Peralta MD    History of Present Illness:   Mrs. Irene Nelson has a history that includes CAD w/ CABG and repair of ventricular septal aneurysm and rupture in 1988. She had repeat LHC in 2007 that revealed patent grafts. She is on Coumadin for atrial fibrillation. She has a known cmp with moderate valvular disease and RVSP of 38. She has had complaints of dry mouth and dizziness in the past.      Today, she is here with her . They spent the winter in [place]. Aura Bustamante denies chest pain, palpitations, STOKES, dizziness, or edema. She wears compression stockings daily. She does all the housework and cooks. She is tolerating the Entresto well and feels that this has helped her STOKES. Past Medical History:   has a past medical history of AF (atrial fibrillation) ([address] CAD (coronary artery disease), Hyperlipidemia, Hypertension, and Valvular heart disease. Surgical History:   has a past surgical history that includes Coronary artery bypass graft and Diagnostic Cardiac Cath Lab Procedure.      Current Outpatient Medications   Medication Sig Dispense Refill    torsemide (DEMADEX) 10 MG tablet Take 1 tablet by mouth daily 90 tablet 3    GLUCOSAMINE CHONDROITIN COMPLX PO Take by mouth      sacubitril-valsartan (ENTRESTO) 24-26 MG per tablet Take 1 tablet by mouth 2 times daily 180 tablet 3    Multiple Vitamins-Minerals (ICAPS AREDS 2 PO) Take by mouth      atorvastatin (LIPITOR) 10 MG tablet Take 1 tablet by mouth daily 90 tablet 3    warfarin (COUMADIN) 2 MG tablet Take 1 tablet by mouth daily      oxybutynin (DITROPAN) 5 MG tablet Take 1 tablet by mouth daily      co-enzyme Q-10 30 MG capsule Take 100 mg by mouth daily       levothyroxine (SYNTHROID) 75 MCG tablet

## 2023-04-27 ENCOUNTER — OFFICE VISIT (OUTPATIENT)
Dept: CARDIOLOGY CLINIC | Age: 88
End: 2023-04-27
Payer: MEDICARE

## 2023-04-27 VITALS
OXYGEN SATURATION: 96 % | BODY MASS INDEX: 20.16 KG/M2 | HEIGHT: 65 IN | WEIGHT: 121 LBS | DIASTOLIC BLOOD PRESSURE: 56 MMHG | HEART RATE: 52 BPM | SYSTOLIC BLOOD PRESSURE: 118 MMHG

## 2023-04-27 DIAGNOSIS — I25.10 CORONARY ARTERY DISEASE INVOLVING NATIVE CORONARY ARTERY OF NATIVE HEART WITHOUT ANGINA PECTORIS: ICD-10-CM

## 2023-04-27 DIAGNOSIS — I10 ESSENTIAL HYPERTENSION: ICD-10-CM

## 2023-04-27 DIAGNOSIS — I48.20 CHRONIC ATRIAL FIBRILLATION (HCC): Primary | ICD-10-CM

## 2023-04-27 DIAGNOSIS — E78.5 HYPERLIPIDEMIA, UNSPECIFIED HYPERLIPIDEMIA TYPE: ICD-10-CM

## 2023-04-27 DIAGNOSIS — I50.42 SYSTOLIC AND DIASTOLIC CHF, CHRONIC (HCC): ICD-10-CM

## 2023-04-27 PROCEDURE — 99214 OFFICE O/P EST MOD 30 MIN: CPT | Performed by: INTERNAL MEDICINE

## 2023-04-27 PROCEDURE — 1123F ACP DISCUSS/DSCN MKR DOCD: CPT | Performed by: INTERNAL MEDICINE

## 2023-05-01 ENCOUNTER — TELEPHONE (OUTPATIENT)
Dept: CARDIOLOGY CLINIC | Age: 88
End: 2023-05-01

## 2023-05-02 NOTE — TELEPHONE ENCOUNTER
She will be taking for 9 days. Per Dr. Haylie Mustafa no problem with taking, but keep an eye on her INR. I spoke to Dr. Haylie Mustafa and gave instructions.

## 2023-07-18 NOTE — PROGRESS NOTES
regurgitation is present. Moderate-severe tricuspid regurgitation. RVSP 45mmHg. The right atrium is severely dilated. Inferior vena cava appears dilated  -CABG 10/1988> repair of ventricular septal rupture. CABG w/ SVG-intermediate and OM branch and the posterolateral branch of the Cx, LAD is occluded. 2. Essential hypertension, benign: Stable  Discontinued aldactone due to K+ 5.5, most recent K+ 7/1/23 4.5   3. Hyperlipidemia, unspecified hyperlipidemia type  Not taking Lipitor 10mg daily d/u elevated liver enzymes. Restarted Lipitor and will keep close eye on liver enzymes. Labs 11/13/21:  ; TRIG 53; HDL 75; LDL 37   4. Atrial fibrillation, now seems to be chronic with controlled ventricular response: HR irregular & controlled. Will change Warfarin to Eliquis 2.5mg BID   5. Edema, h/o: from varicose veins, advised to continue to wear compression hose She can increase her torsemide to 20 mg daily for 5 days. 6.  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 7.  Elevated liver enzymes in the past: AST 44/ALT 15 on labs 11/13/21, repeat labs 1/3/23: AST 31/ALT 11, alk phos 119  8. Volume depletion, hx of. BUN/Cr 29/1.08 on labs 1/3/23    9. TIA - will change Warfarin to Eliquis 2.5mg BID (lower dose due to age and weight). PLAN:  She will stop Warfarin 2 days prior to starting Eliquis 2.5mg BID. She can take Torsemide 20mg daily x6 days and call on Monday with how her legs are. Scribe's attestation: This note was scribed in the presence of Dr Edmundo Beard MD by Celia Real RN. The scribe's documentation has been prepared under my direction and personally reviewed by me in its entirety. I confirm that the note above accurately reflects all work, treatment, procedures, and medical decision making performed by me. Thank you for allowing to me to participate in the care of Jens Gomez.

## 2023-07-25 ENCOUNTER — OFFICE VISIT (OUTPATIENT)
Dept: CARDIOLOGY CLINIC | Age: 88
End: 2023-07-25
Payer: MEDICARE

## 2023-07-25 VITALS
SYSTOLIC BLOOD PRESSURE: 132 MMHG | BODY MASS INDEX: 21.16 KG/M2 | HEIGHT: 65 IN | OXYGEN SATURATION: 98 % | HEART RATE: 58 BPM | DIASTOLIC BLOOD PRESSURE: 64 MMHG | WEIGHT: 127 LBS

## 2023-07-25 DIAGNOSIS — I48.20 CHRONIC ATRIAL FIBRILLATION (HCC): Primary | ICD-10-CM

## 2023-07-25 DIAGNOSIS — E78.5 HYPERLIPIDEMIA, UNSPECIFIED HYPERLIPIDEMIA TYPE: ICD-10-CM

## 2023-07-25 DIAGNOSIS — I50.42 SYSTOLIC AND DIASTOLIC CHF, CHRONIC (HCC): ICD-10-CM

## 2023-07-25 DIAGNOSIS — I10 ESSENTIAL HYPERTENSION: ICD-10-CM

## 2023-07-25 DIAGNOSIS — I25.10 CORONARY ARTERY DISEASE INVOLVING NATIVE CORONARY ARTERY OF NATIVE HEART WITHOUT ANGINA PECTORIS: ICD-10-CM

## 2023-07-25 PROCEDURE — 99214 OFFICE O/P EST MOD 30 MIN: CPT | Performed by: INTERNAL MEDICINE

## 2023-07-25 PROCEDURE — 1123F ACP DISCUSS/DSCN MKR DOCD: CPT | Performed by: INTERNAL MEDICINE

## 2023-07-25 NOTE — PATIENT INSTRUCTIONS
Stop Warfarin 2 days prior to starting Eliquis 2.5mg twice daily      Take Torsemide 20mg daily x5 days and call on Monday with how your legs are ([phone])

## 2023-07-31 ENCOUNTER — TELEPHONE (OUTPATIENT)
Dept: CARDIOLOGY CLINIC | Age: 88
End: 2023-07-31

## 2023-07-31 DIAGNOSIS — R60.0 LOCALIZED EDEMA: Primary | ICD-10-CM

## 2023-07-31 NOTE — TELEPHONE ENCOUNTER
Called and spoke w/ pt and her daughter. Courtney Shafer states with the increase in Torsemide to 20mg daily, left leg edema is improved. Her daughter states by the end of the day her left leg is red, tender, and more swollen than the right. Her daughter is concerned that Ami's energy is decreased and she is urinating more on the increased dose. Per Dr Kay>continue Torsemide 20mg daily and repeat BMP in 1 week. I relayed instructions to Courtney Shafer and her daughter. Order faxed to [facility] for BMP in 1 week.

## 2023-08-07 LAB
ANION GAP SERPL CALCULATED.3IONS-SCNC: 9 MMOL/L (ref 4–16)
BUN BLDV-MCNC: 31 MG/DL (ref 8–26)
CALCIUM SERPL-MCNC: 9.5 MG/DL (ref 8.5–10.4)
CHLORIDE BLD-SCNC: 105 MEQ/L (ref 98–111)
CO2: 27 MMOL/L (ref 21–31)
CREAT SERPL-MCNC: 1.03 MG/DL (ref 0.6–1.2)
EGFR (CKD-EPI): 51 ML/MIN/1.73 M2
GLUCOSE BLD-MCNC: 95 MG/DL (ref 70–99)
POTASSIUM SERPL-SCNC: 3.6 MEQ/L (ref 3.6–5.1)
SODIUM BLD-SCNC: 141 MEQ/L (ref 135–145)

## 2023-08-09 ENCOUNTER — TELEPHONE (OUTPATIENT)
Dept: CARDIOLOGY CLINIC | Age: 88
End: 2023-08-09

## 2023-08-09 NOTE — TELEPHONE ENCOUNTER
I called Ms Tello Tanja to discuss labs done 8/7/23.   for her to call and let me know how her swelling is

## 2023-08-10 NOTE — TELEPHONE ENCOUNTER
Her Legs are doing better. Per Dr. Radha Salcedo she can go back to taking 10mg of Torsemide daily. Pt notified.

## 2023-10-06 NOTE — PROGRESS NOTES
hyperpigmentation of the lower legs;  she usually wears compression stockings. Bandage from Mohs on the left. 2+ edema LLE   Femoral Arteries: 2+ and equal without bruits  Pedal Pulses: 2+ and equal   Abdomen:  No masses or tenderness  Aorta not palpable  Normal bowel sounds  Neurological/Psychiatric:  Alert and oriented x3  Moves all extremities well  Exhibits normal gait balance and coordination    Assessment:  1. Atherosclerosis of native coronary artery of native heart without angina pectoris: Stable without anginal symptoms  -Echo 5/27/22> Normal left ventricular size. Mild concentric left ventricular hypertrophy. Decreased left ventricular systolic function with mid to distal inferoseptal and apical hypokinesis. No evidence of apical clots. Contrast unavailable. History of ventricular septum aneurysm and rupture repair. No evidence of shunting. Estimated EF 35-40% E/e'=13. Diastolic dysfunction. Mild mitral valve prolapse. Moderate mitral regurgitation. The left atrium is severely dilated. The aortic valve appears tricuspid with mild sclerosis no stenosis. Moderate aortic regurgitation is present. Pressure half-time is calculated at 559 msec. The right ventricle is mildly enlarged and mildly decreased in function. Severe tricuspid regurgitation. RVSP 38mmHg. The right atrium is severely dilated. Mild pulmonic regurgitation. IVC size is normal (<2.1 cm) but collapses < 50% with respiration consistent with elevated RA pressure (8 mmHg). -Echo 12/11/18> Mild cLVH. Mildly decreased left ventricular systolic function with distal inferoseptal and apical akinesis. No evidence of clot or shunt. Hx of ventricular septum aneurysm and rupture repair. EF 40-45%. Mitral valve sclerosis with mild posterior mitral valve prolapse. Moderate mitral regurgitation with no pulmonary vein reversal. The left atrium is dilated. The aortic valve appears tricuspid with mild sclerosis no stenosis.  Moderate aortic regurgitation is

## 2023-10-10 ENCOUNTER — OFFICE VISIT (OUTPATIENT)
Dept: CARDIOLOGY CLINIC | Age: 88
End: 2023-10-10
Payer: MEDICARE

## 2023-10-10 VITALS
OXYGEN SATURATION: 97 % | DIASTOLIC BLOOD PRESSURE: 70 MMHG | HEIGHT: 65 IN | BODY MASS INDEX: 19.99 KG/M2 | SYSTOLIC BLOOD PRESSURE: 118 MMHG | WEIGHT: 120 LBS | HEART RATE: 55 BPM

## 2023-10-10 DIAGNOSIS — I25.10 CORONARY ARTERY DISEASE INVOLVING NATIVE CORONARY ARTERY OF NATIVE HEART WITHOUT ANGINA PECTORIS: Primary | ICD-10-CM

## 2023-10-10 DIAGNOSIS — I48.20 CHRONIC ATRIAL FIBRILLATION (HCC): ICD-10-CM

## 2023-10-10 DIAGNOSIS — E78.5 HYPERLIPIDEMIA, UNSPECIFIED HYPERLIPIDEMIA TYPE: ICD-10-CM

## 2023-10-10 DIAGNOSIS — I10 ESSENTIAL HYPERTENSION: ICD-10-CM

## 2023-10-10 PROCEDURE — 1123F ACP DISCUSS/DSCN MKR DOCD: CPT | Performed by: INTERNAL MEDICINE

## 2023-10-10 PROCEDURE — 99214 OFFICE O/P EST MOD 30 MIN: CPT | Performed by: INTERNAL MEDICINE

## 2023-10-10 RX ORDER — ATORVASTATIN CALCIUM 10 MG/1
10 TABLET, FILM COATED ORAL DAILY
Qty: 90 TABLET | Refills: 3 | Status: SHIPPED | OUTPATIENT
Start: 2023-10-10

## 2023-10-11 LAB
CHOLESTEROL, TOTAL: 110 MG/DL
HDLC SERPL-MCNC: 63 MG/DL
LDL CHOLESTEROL CALCULATED: 33 MG/DL
NONHDLC SERPL-MCNC: 47 MG/DL
TRIGL SERPL-MCNC: 68 MG/DL

## 2023-10-26 NOTE — TELEPHONE ENCOUNTER
Spoke w/ pt's  and gave echo results per Dr Margo Greenfield. [facility]. I let Mr Kassandra Halsted know I will fax the orders for labs to [facility]. He states they will go tomorrow morning to have them drawn. none

## 2023-12-22 NOTE — PROGRESS NOTES
sacubitril-valsartan (ENTRESTO) 24-26 MG per tablet Take 1 tablet by mouth 2 times daily 180 tablet 3    torsemide (DEMADEX) 10 MG tablet Take 1 tablet by mouth daily 90 tablet 3    fluorouracil (EFUDEX) 5 % cream SMARTSIG:Topical      GLUCOSAMINE CHONDROITIN COMPLX PO Take by mouth Bid      Multiple Vitamins-Minerals (ICAPS AREDS 2 PO) Take by mouth      oxybutynin (DITROPAN) 5 MG tablet Take 1 tablet by mouth daily      co-enzyme Q-10 30 MG capsule Take 100 mg by mouth daily       levothyroxine (SYNTHROID) 75 MCG tablet Take 1 tablet by mouth daily      folic acid (FOLVITE) 1 MG tablet Take 400 mcg by mouth daily        No current facility-administered medications for this visit.     Social History:  Social History     Socioeconomic History    Marital status:      Spouse name: Not on file    Number of children: Not on file    Years of education: Not on file    Highest education level: Not on file   Occupational History    Not on file   Tobacco Use    Smoking status: Former    Smokeless tobacco: Never    Tobacco comments:     quit 20 years   Vaping Use    Vaping Use: Never used   Substance and Sexual Activity    Alcohol use: Yes     Comment: minimal/1 glass of wine each night    Drug use: No    Sexual activity: Not on file   Other Topics Concern    Not on file   Social History Narrative    Not on file     Social Determinants of Health     Financial Resource Strain: Not on file   Food Insecurity: Not on file   Transportation Needs: Not on file   Physical Activity: Not on file   Stress: Not on file   Social Connections: Not on file   Intimate Partner Violence: Not on file   Housing Stability: Not on file     Family History:  family history includes Cancer in her father and sister; Other in her mother.     Allergies:  Levaquin [levofloxacin in d5w] and Spironolactone     Review of Systems:   Constitutional: there has been no unanticipated weight loss.  Eyes: No visual changes   ENT: No Headaches, hearing loss

## 2024-01-03 ENCOUNTER — OFFICE VISIT (OUTPATIENT)
Dept: CARDIOLOGY CLINIC | Age: 89
End: 2024-01-03
Payer: MEDICARE

## 2024-01-03 VITALS
DIASTOLIC BLOOD PRESSURE: 62 MMHG | WEIGHT: 116 LBS | HEIGHT: 65 IN | SYSTOLIC BLOOD PRESSURE: 128 MMHG | HEART RATE: 80 BPM | BODY MASS INDEX: 19.33 KG/M2

## 2024-01-03 DIAGNOSIS — I10 ESSENTIAL HYPERTENSION: ICD-10-CM

## 2024-01-03 DIAGNOSIS — I25.10 CORONARY ARTERY DISEASE INVOLVING NATIVE CORONARY ARTERY OF NATIVE HEART WITHOUT ANGINA PECTORIS: Primary | ICD-10-CM

## 2024-01-03 DIAGNOSIS — E78.5 HYPERLIPIDEMIA, UNSPECIFIED HYPERLIPIDEMIA TYPE: ICD-10-CM

## 2024-01-03 DIAGNOSIS — I48.20 CHRONIC ATRIAL FIBRILLATION (HCC): ICD-10-CM

## 2024-01-03 PROCEDURE — 99214 OFFICE O/P EST MOD 30 MIN: CPT | Performed by: INTERNAL MEDICINE

## 2024-01-03 PROCEDURE — 1123F ACP DISCUSS/DSCN MKR DOCD: CPT | Performed by: INTERNAL MEDICINE

## 2024-01-03 RX ORDER — VITAMIN B COMPLEX
1 CAPSULE ORAL DAILY
COMMUNITY

## 2024-04-26 NOTE — PROGRESS NOTES
[facility]   Cardiac Evaluation      Patient: Ami Sheikh  YOB: 1931  Date: 5/14/24     Chief Complaint   Patient presents with    Hypertension    Hyperlipidemia          Referring provider: Walter Espinosa MD    History of Present Illness:   Mrs. Sheikh has a history that includes CAD w/ CABG and repair of ventricular septal aneurysm and rupture in 1988. She had repeat LHC in 2007 that revealed patent grafts. She is on Coumadin for atrial fibrillation. She has a known cmp with moderate valvular disease and RVSP of 38. She has had complaints of dry mouth and dizziness in the past.      She fell early June and was hospitalized at [facility] 6/5-6/12/23. She suffered left elbow and hip fracture. She underwent open reduction internal fixation of olecranon. Orthopedic surgery recommended conservative management of left hip. She was discharged to SNF for rehab. On 7/1/23 she was taken to [facility] by EMS for expressive aphasia at [facility]. Non contrast head CT and CTA head/neck were negative for hemorrhage, acute stroke or large vessel occlusion. She was discharged back to SNF     Today, Ms Sheikh is here with her . They recently returned from [place] where they spent the winter. She was ill over the weekend with diarrhea and \"a bug\". She slept most of the weekend and is still fatigued. She has occasional dizziness. Labs done prior to that illness showed prerenal azotemia.  Ami denies any chest pain, palpitations, STOKES, or edema.       Past Medical History:   has a past medical history of AF (atrial fibrillation) ([facility]), CAD (coronary artery disease), Hyperlipidemia, Hypertension, and Valvular heart disease.    Surgical History:   has a past surgical history that includes Coronary artery bypass graft and Diagnostic Cardiac Cath Lab Procedure.     Current Outpatient Medications   Medication Sig Dispense Refill    b complex vitamins capsule Take 1 capsule by mouth daily

## 2024-05-14 ENCOUNTER — OFFICE VISIT (OUTPATIENT)
Dept: CARDIOLOGY CLINIC | Age: 89
End: 2024-05-14
Payer: MEDICARE

## 2024-05-14 VITALS
BODY MASS INDEX: 18.83 KG/M2 | SYSTOLIC BLOOD PRESSURE: 90 MMHG | DIASTOLIC BLOOD PRESSURE: 44 MMHG | HEART RATE: 51 BPM | HEIGHT: 65 IN | OXYGEN SATURATION: 96 % | WEIGHT: 113 LBS

## 2024-05-14 DIAGNOSIS — E78.5 HYPERLIPIDEMIA, UNSPECIFIED HYPERLIPIDEMIA TYPE: ICD-10-CM

## 2024-05-14 DIAGNOSIS — R60.0 LOCALIZED EDEMA: ICD-10-CM

## 2024-05-14 DIAGNOSIS — I50.42 SYSTOLIC AND DIASTOLIC CHF, CHRONIC (HCC): ICD-10-CM

## 2024-05-14 DIAGNOSIS — I25.10 CORONARY ARTERY DISEASE INVOLVING NATIVE CORONARY ARTERY OF NATIVE HEART WITHOUT ANGINA PECTORIS: Primary | ICD-10-CM

## 2024-05-14 DIAGNOSIS — I10 ESSENTIAL HYPERTENSION: ICD-10-CM

## 2024-05-14 DIAGNOSIS — I48.20 CHRONIC ATRIAL FIBRILLATION (HCC): ICD-10-CM

## 2024-05-14 PROCEDURE — 99214 OFFICE O/P EST MOD 30 MIN: CPT | Performed by: INTERNAL MEDICINE

## 2024-05-14 PROCEDURE — 1123F ACP DISCUSS/DSCN MKR DOCD: CPT | Performed by: INTERNAL MEDICINE

## 2024-05-14 NOTE — PATIENT INSTRUCTIONS
Hold Torsemide for 1 week, then restart. Decrease Entresto to 1/2 tab BID for 1 week.     Increase fluids.

## 2024-05-20 ENCOUNTER — TELEPHONE (OUTPATIENT)
Dept: CARDIOLOGY CLINIC | Age: 89
End: 2024-05-20

## 2024-05-20 NOTE — TELEPHONE ENCOUNTER
I spoke w/ pt's . She has been holding Torsemide and taking Entresto 1/2 BID per Dr Kay's instructions on 5/14/24. He states her b/p ranges 110-150's since w/ med changes. He states Ami fell on Thursday and hit her head. She was admitted to [facility], then transferred to [place]. She was advised to hold Eliquis x7 days d/t subdural hematoma. Repeat CT showed no SDH, she was advised to follow up with cardiologists. Ami's  is asking for Dr Kay's recommendations on medications.     I spoke w/ Dr Kay and she recommend Shelly stay off Torsemide and Eliquis at this time. Continue Entresto at half dose. Dr Kay will call Dr Espinosa to discuss Ami's recent event and next steps.    I relayed instructions to Ami and her . They both verbalized understanding. She has been taking 1/2 Entresto dose QD, not BID. Will continue as they have been doing for now.

## 2024-05-20 NOTE — TELEPHONE ENCOUNTER
Pt  called wanting to know what medication pt should take this morning since there was changes in pts medications.    Pls advise

## 2024-05-23 ENCOUNTER — TELEPHONE (OUTPATIENT)
Dept: CARDIOLOGY CLINIC | Age: 89
End: 2024-05-23

## 2024-05-23 NOTE — TELEPHONE ENCOUNTER
Spoke w/ pt's  with her on speaker. Her blood pressure has been consistently >140 since decreasing Entresto to 1/2 BID. She is off Torsemide and Eliquis currently. They states Eliquis is being re-addressed on Tuesday w/ pcp.  They are going out of town for the weekend and hope that is ok with Dr Kay.

## 2024-05-23 NOTE — TELEPHONE ENCOUNTER
I spoke w/ Ami's  and relayed Dr Kay's instructions. He verbalized understanding, they will keep us updated on b/p

## 2024-05-28 ENCOUNTER — TELEPHONE (OUTPATIENT)
Dept: CARDIOLOGY CLINIC | Age: 89
End: 2024-05-28

## 2024-05-28 RX ORDER — TORSEMIDE 10 MG/1
10 TABLET ORAL EVERY OTHER DAY
Qty: 90 TABLET | Refills: 3
Start: 2024-05-28

## 2024-05-28 NOTE — TELEPHONE ENCOUNTER
The patient's  Anthony phoned to inform [facility] that the patient had her test completed today and the results would be available later today.

## 2024-05-28 NOTE — TELEPHONE ENCOUNTER
Spoke w/ pt's . Ami had head CT today and he is asking about restarting her Eliquis. Reviewed results with Dr Kay. CT head done at [facility].    IMPRESSION   Prior small right subdural hematoma has resolved in the interval.      Per Dr Kay, ok to restart Eliquis 2.5mg BID.    I called and relayed Dr Kay's instructions to Ami and her . She will restart Eliquis 2.5mg BID. Ami also asked about restarting Torsemide. She states her left leg is progressively becoming more swollen. She states her left leg has a \"pouch of water\" on it. Right leg feels warm and heavy, but is not edematous. LE edema started approx 5 days ago. They were on a trip and sat more while out of town. She denies any SOB/PND. Sb/p is consistently 130's.     Discussed symptoms with Dr Kay. Per Dr Kay, can restart Torsemide 10mg QOD. I relayed instructions to Ami. She verbalized understanding. She will call with an update on her legs in approx 1 week.

## 2024-05-30 ENCOUNTER — TELEPHONE (OUTPATIENT)
Dept: CARDIOLOGY CLINIC | Age: 89
End: 2024-05-30

## 2024-06-04 ENCOUNTER — TELEPHONE (OUTPATIENT)
Dept: CARDIOLOGY CLINIC | Age: 89
End: 2024-06-04

## 2024-06-04 NOTE — TELEPHONE ENCOUNTER
Please call patient in regards to her medicine change and she states her bp is running a little high.   @[phone]

## 2024-06-05 NOTE — TELEPHONE ENCOUNTER
Spoke w/ Ami and her . They check her b/p in am and at night. Sbp is never <130 and is usually in 150's. She states she feels exhausted all the time and has no \"pep\". She is requesting to go back on her previous dose of Entresto.

## 2024-06-05 NOTE — TELEPHONE ENCOUNTER
I spoke w/ Ms Sheikh's  and relayed instructions. He verbalized understanding. They will call if b/p is low.

## 2024-06-12 ENCOUNTER — OFFICE VISIT (OUTPATIENT)
Dept: CARDIOLOGY CLINIC | Age: 89
End: 2024-06-12
Payer: MEDICARE

## 2024-06-12 VITALS
BODY MASS INDEX: 18.44 KG/M2 | SYSTOLIC BLOOD PRESSURE: 110 MMHG | HEIGHT: 64 IN | HEART RATE: 54 BPM | WEIGHT: 108 LBS | OXYGEN SATURATION: 100 % | DIASTOLIC BLOOD PRESSURE: 56 MMHG

## 2024-06-12 DIAGNOSIS — I48.20 CHRONIC ATRIAL FIBRILLATION (HCC): ICD-10-CM

## 2024-06-12 DIAGNOSIS — I25.10 CORONARY ARTERY DISEASE INVOLVING NATIVE CORONARY ARTERY OF NATIVE HEART WITHOUT ANGINA PECTORIS: Primary | ICD-10-CM

## 2024-06-12 DIAGNOSIS — E78.5 HYPERLIPIDEMIA, UNSPECIFIED HYPERLIPIDEMIA TYPE: ICD-10-CM

## 2024-06-12 DIAGNOSIS — I10 ESSENTIAL HYPERTENSION: ICD-10-CM

## 2024-06-12 PROCEDURE — 99214 OFFICE O/P EST MOD 30 MIN: CPT | Performed by: INTERNAL MEDICINE

## 2024-06-12 PROCEDURE — 1123F ACP DISCUSS/DSCN MKR DOCD: CPT | Performed by: INTERNAL MEDICINE

## 2024-06-12 NOTE — PROGRESS NOTES
[facility]   Cardiac Evaluation      Patient: Ami Sheikh  YOB: 1931  Date: 6/12/24     Chief Complaint   Patient presents with    Coronary Artery Disease    Follow-Up from Hospital    Fatigue     Fatigue- extreme          Referring provider: Walter Espinosa MD    History of Present Illness:   Mrs. Sheikh has a history that includes CAD w/ CABG and repair of ventricular septal aneurysm and rupture in 1988. She had repeat LHC in 2007 that revealed patent grafts. She is on Coumadin for atrial fibrillation. She has a known cmp with moderate valvular disease and RVSP of 38. She has had complaints of dry mouth and dizziness in the past.      She fell early June and was hospitalized at [facility] 6/5-6/12/23. She suffered left elbow and hip fracture. She underwent open reduction internal fixation of olecranon. Orthopedic surgery recommended conservative management of left hip. She was discharged to SNF for rehab. On 7/1/23 she was taken to [facility] by EMS for expressive aphasia at [facility]. Non contrast head CT and CTA head/neck were negative for hemorrhage, acute stroke or large vessel occlusion. She was discharged back to SNF     Ami fell at home on 5/17/24 and was hospitalized at [facility] and transferred to [place].  Head CT 5/17/24 showed thin subdural hematoma at lateral frontotemporal region not exceeding 3mm in thickness. Eliquis was put on hold. She had a repeat head CT on 5/28/24 that stated the prior small right subdural hematoma resolved in the interval. She was placed back on Eliquis 2.5mg BID. She went back to the hospital on 6/7/24 w/ HA, photosensitivity, and nausea x1 week. MRI done .6/7/24 revealed small acute subdural hematoma overlying the inferior anterior right frontal lobe 3-4mm. She was advised to stop Eliquis and see her cardiologist to discuss Watchman procedure.  She has not seen a neurologist.      Today, Ms Sheikh is here with her  and daughter. She

## 2024-06-24 ENCOUNTER — TELEPHONE (OUTPATIENT)
Dept: CARDIOLOGY CLINIC | Age: 89
End: 2024-06-24

## 2024-06-24 NOTE — TELEPHONE ENCOUNTER
Anthony wanted to update us that Ami is still off her Eliquis and they have an apt with Neuro on Friday.

## 2024-06-27 ENCOUNTER — TELEPHONE (OUTPATIENT)
Dept: CARDIOLOGY CLINIC | Age: 89
End: 2024-06-27

## 2024-06-27 NOTE — TELEPHONE ENCOUNTER
Daughter/Holli states she left a message on 6/24 & 6/25. Pt has appt tomorrow and looking for a answer from message before appt tomorrow. Please call or respond to message

## 2024-06-27 NOTE — TELEPHONE ENCOUNTER
Called and spoke w/ Holli. I informed her Dr Kay responded to [facility] [employer] message. She will call with any other questions or concerns.

## 2024-06-28 ENCOUNTER — TELEPHONE (OUTPATIENT)
Dept: CARDIOLOGY CLINIC | Age: 89
End: 2024-06-28

## 2024-06-28 NOTE — TELEPHONE ENCOUNTER
Spoke w/ Mr Sheikh. He states Ami saw neurologist today and he advised her that she can restart Eliquis and head bleed is clear. Anthony is also asking about Torsemide dosing, if she should continue QOD. He states her swelling is not a problem currently. Reviewed with Dr Kay and advised him to continue with Torsemide QOD. Anthony verbalized understanding.

## 2024-09-17 DIAGNOSIS — E78.5 HYPERLIPIDEMIA, UNSPECIFIED HYPERLIPIDEMIA TYPE: ICD-10-CM

## 2024-09-18 RX ORDER — ATORVASTATIN CALCIUM 10 MG/1
10 TABLET, FILM COATED ORAL DAILY
Qty: 90 TABLET | Refills: 3 | Status: SHIPPED | OUTPATIENT
Start: 2024-09-18

## 2024-11-06 NOTE — PROGRESS NOTES
[facility]   Cardiac Evaluation      Patient: Ami Sheikh  YOB: 1931  Date: 11/12/24     Chief Complaint   Patient presents with    Atrial Fibrillation    Cardiomyopathy          Referring provider: Walter Espinosa MD    History of Present Illness:   Mrs. Sheikh has a history that includes CAD w/ CABG and repair of ventricular septal aneurysm and rupture in 1988. She had repeat LHC in 2007 that revealed patent grafts. She is on Coumadin for atrial fibrillation. She has a known cmp with moderate valvular disease and RVSP of 38. She has had complaints of dry mouth and dizziness in the past.      She fell early June and was hospitalized at [facility] 6/5-6/12/23. She suffered left elbow and hip fracture. She underwent open reduction internal fixation of olecranon. Orthopedic surgery recommended conservative management of left hip. She was discharged to SNF for rehab. On 7/1/23 she was taken to [facility] by EMS for expressive aphasia at [facility]. Non contrast head CT and CTA head/neck were negative for hemorrhage, acute stroke or large vessel occlusion. She was discharged back to SNF     Ami fell at home on 5/17/24 and was hospitalized at [facility] and transferred to [place].  Head CT 5/17/24 showed thin subdural hematoma at lateral frontotemporal region not exceeding 3mm in thickness. Eliquis was put on hold. She had a repeat head CT on 5/28/24 that stated the prior small right subdural hematoma resolved in the interval. She was placed back on Eliquis 2.5mg BID. She went back to the hospital on 6/7/24 w/ HA, photosensitivity, and nausea x1 week. MRI done .6/7/24 revealed small acute subdural hematoma overlying the inferior anterior right frontal lobe 3-4mm. She was advised to stop Eliquis and see her cardiologist to discuss Watchman procedure. She has since followed up with neurology and is taking Eliquis 2.5mg BID.      She was hospitalized 7/28-8/3/4 with dysarthria and right sided

## 2024-11-12 ENCOUNTER — OFFICE VISIT (OUTPATIENT)
Dept: CARDIOLOGY CLINIC | Age: 89
End: 2024-11-12

## 2024-11-12 VITALS
SYSTOLIC BLOOD PRESSURE: 134 MMHG | BODY MASS INDEX: 23.73 KG/M2 | OXYGEN SATURATION: 98 % | DIASTOLIC BLOOD PRESSURE: 72 MMHG | HEIGHT: 64 IN | WEIGHT: 139 LBS | HEART RATE: 50 BPM

## 2024-11-12 DIAGNOSIS — I48.20 CHRONIC ATRIAL FIBRILLATION (HCC): ICD-10-CM

## 2024-11-12 DIAGNOSIS — I25.5 ISCHEMIC CARDIOMYOPATHY: ICD-10-CM

## 2024-11-12 DIAGNOSIS — I25.10 CORONARY ARTERY DISEASE INVOLVING NATIVE CORONARY ARTERY OF NATIVE HEART WITHOUT ANGINA PECTORIS: Primary | ICD-10-CM

## 2024-11-12 DIAGNOSIS — I50.42 SYSTOLIC AND DIASTOLIC CHF, CHRONIC (HCC): ICD-10-CM

## 2024-11-12 DIAGNOSIS — I10 ESSENTIAL HYPERTENSION: ICD-10-CM

## 2024-11-12 RX ORDER — LEVETIRACETAM 250 MG/1
250 TABLET ORAL 2 TIMES DAILY
COMMUNITY
Start: 2024-10-18 | End: 2024-11-12

## 2024-11-12 RX ORDER — TORSEMIDE 20 MG/1
TABLET ORAL
Qty: 60 TABLET | Refills: 6
Start: 2024-11-12

## 2024-11-12 RX ORDER — PSEUDOEPHEDRINE HCL 30 MG
100 TABLET ORAL 2 TIMES DAILY
COMMUNITY
Start: 2024-08-01

## 2024-11-12 RX ORDER — ACETAMINOPHEN 325 MG/1
650 TABLET ORAL EVERY 6 HOURS PRN
COMMUNITY

## 2024-11-12 RX ORDER — TRAZODONE HYDROCHLORIDE 150 MG/1
150 TABLET ORAL NIGHTLY
COMMUNITY
Start: 2024-10-26

## 2024-12-12 NOTE — PROGRESS NOTES
[facility]   Cardiac Evaluation      Patient: Ami Sheikh  YOB: 1931  Date: 12/18/24     Chief Complaint   Patient presents with    Atrial Fibrillation    Hypertension    Coronary Artery Disease          Referring provider: Walter Espinosa MD    History of Present Illness:   Mrs. Sheikh has a history that includes CAD w/ CABG and repair of ventricular septal aneurysm and rupture in 1988. She had repeat LHC in 2007 that revealed patent grafts. She is on Coumadin for atrial fibrillation. She has a known cmp with moderate valvular disease and RVSP of 38. She has had complaints of dry mouth and dizziness in the past.      She fell early June and was hospitalized at [facility] 6/5-6/12/23. She suffered left elbow and hip fracture. She underwent open reduction internal fixation of olecranon. Orthopedic surgery recommended conservative management of left hip. She was discharged to [facility] for rehab. On 7/1/23 she was taken to [facility] by EMS for expressive aphasia at [facility]. Non contrast head CT and CTA head/neck were negative for hemorrhage, acute stroke or large vessel occlusion. She was discharged back to SNF     Ami fell at home on 5/17/24 and was hospitalized at [facility] and transferred to [place].  Head CT 5/17/24 showed thin subdural hematoma at lateral frontotemporal region not exceeding 3mm in thickness. Eliquis was put on hold. She had a repeat head CT on 5/28/24 that stated the prior small right subdural hematoma resolved in the interval. She was placed back on Eliquis 2.5mg BID. She went back to the hospital on 6/7/24 w/ HA, photosensitivity, and nausea x1 week. MRI done .6/7/24 revealed small acute subdural hematoma overlying the inferior anterior right frontal lobe 3-4mm. She was advised to stop Eliquis and see her cardiologist to discuss Watchman procedure. She has since followed up with neurology and is taking Eliquis 2.5mg BID.      She was hospitalized 7/28-8/3/4 with

## 2024-12-18 ENCOUNTER — OFFICE VISIT (OUTPATIENT)
Dept: CARDIOLOGY CLINIC | Age: 88
End: 2024-12-18
Payer: MEDICARE

## 2024-12-18 VITALS
DIASTOLIC BLOOD PRESSURE: 52 MMHG | HEART RATE: 61 BPM | WEIGHT: 124 LBS | HEIGHT: 65 IN | OXYGEN SATURATION: 98 % | BODY MASS INDEX: 20.66 KG/M2 | SYSTOLIC BLOOD PRESSURE: 102 MMHG

## 2024-12-18 DIAGNOSIS — I48.20 CHRONIC ATRIAL FIBRILLATION (HCC): ICD-10-CM

## 2024-12-18 DIAGNOSIS — I50.42 SYSTOLIC AND DIASTOLIC CHF, CHRONIC (HCC): ICD-10-CM

## 2024-12-18 DIAGNOSIS — I10 ESSENTIAL HYPERTENSION: ICD-10-CM

## 2024-12-18 DIAGNOSIS — I25.5 ISCHEMIC CARDIOMYOPATHY: ICD-10-CM

## 2024-12-18 DIAGNOSIS — E78.5 HYPERLIPIDEMIA, UNSPECIFIED HYPERLIPIDEMIA TYPE: ICD-10-CM

## 2024-12-18 DIAGNOSIS — I25.10 CORONARY ARTERY DISEASE INVOLVING NATIVE CORONARY ARTERY OF NATIVE HEART WITHOUT ANGINA PECTORIS: Primary | ICD-10-CM

## 2024-12-18 PROCEDURE — 1159F MED LIST DOCD IN RCRD: CPT | Performed by: INTERNAL MEDICINE

## 2024-12-18 PROCEDURE — 1123F ACP DISCUSS/DSCN MKR DOCD: CPT | Performed by: INTERNAL MEDICINE

## 2024-12-18 PROCEDURE — 99214 OFFICE O/P EST MOD 30 MIN: CPT | Performed by: INTERNAL MEDICINE

## 2024-12-18 RX ORDER — PANTOPRAZOLE SODIUM 40 MG/1
40 TABLET, DELAYED RELEASE ORAL DAILY
COMMUNITY
Start: 2024-11-29

## 2024-12-18 RX ORDER — LEVETIRACETAM 500 MG/1
500 TABLET ORAL 2 TIMES DAILY
COMMUNITY
Start: 2024-12-09

## 2024-12-18 RX ORDER — FERROUS GLUCONATE 324(38)MG
324 TABLET ORAL
COMMUNITY

## 2025-01-13 NOTE — PROGRESS NOTES
regurgitation is present. Moderate-severe tricuspid regurgitation. RVSP 45mmHg. The right atrium is severely dilated. Inferior vena cava appears dilated    -CABG 10/1988> repair of ventricular septal rupture. CABG w/ SVG-intermediate and OM branch and the posterolateral branch of the Cx, LAD is occluded.      2. Essential hypertension, benign:   Discontinued aldactone due to K+ 5.5, most recent K+ on 8/23/24: K+ 4.4   3. Hyperlipidemia, unspecified hyperlipidemia type  previously discontinued Lipitor 10mg daily d/u elevated liver enzymes. Restarted Lipitor and will keep close eye on liver enzymes.  Labs 7/29/24:  ; TRIG 61; HDL 56; LDL 52   4. Atrial fibrillation, now seems to be chronic with controlled ventricular response: HR irregular & controlled.  Eliquis 2.5mg BID    5.  Edema, h/o: Continue Torsemide 20mg BID; her edema is markedly improved probably because her hgb is also.  I am reluctant to increase the torsemide more.  I will check her renal function.     6.  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   7.  Elevated liver enzymes in the past: AST 44/ALT 15 on labs 11/13/21, repeat labs 1/3/23: AST 31/ALT 11, alk phos 119  8.  Volume depletion, hx of. Encouraged to drink more fluids     9.  CVA - previously changed warfarin to Eliquis 2.5mg BID (lower dose due to age and weight).  Neurology is following    10. Subdural hematoma - Eliquis previously on hold, but restarted by neurology  MRI 6/7/24 A small acute subdural hematoma overlying the inferior anterior right frontal lobe only measuring roughly 3-4 mm transverse     End of life planning - hospice is not involved due to her wishes to maintain PT and speech training.  She is basically in palliative care mode and refused transfusions but is getting better with conservative medical management.     PLAN:   Repeat CMP.  Continue medications as they are. FU 2 months.     Elio's

## 2025-01-22 ENCOUNTER — OFFICE VISIT (OUTPATIENT)
Dept: CARDIOLOGY CLINIC | Age: 89
End: 2025-01-22
Payer: MEDICARE

## 2025-01-22 VITALS
SYSTOLIC BLOOD PRESSURE: 120 MMHG | BODY MASS INDEX: 21.46 KG/M2 | WEIGHT: 128.8 LBS | OXYGEN SATURATION: 94 % | HEART RATE: 63 BPM | HEIGHT: 65 IN | DIASTOLIC BLOOD PRESSURE: 82 MMHG

## 2025-01-22 DIAGNOSIS — E78.5 HYPERLIPIDEMIA, UNSPECIFIED HYPERLIPIDEMIA TYPE: ICD-10-CM

## 2025-01-22 DIAGNOSIS — I48.20 CHRONIC ATRIAL FIBRILLATION (HCC): ICD-10-CM

## 2025-01-22 DIAGNOSIS — I25.10 CORONARY ARTERY DISEASE INVOLVING NATIVE CORONARY ARTERY OF NATIVE HEART WITHOUT ANGINA PECTORIS: Primary | ICD-10-CM

## 2025-01-22 DIAGNOSIS — I25.5 ISCHEMIC CARDIOMYOPATHY: ICD-10-CM

## 2025-01-22 DIAGNOSIS — I10 ESSENTIAL HYPERTENSION: ICD-10-CM

## 2025-01-22 PROCEDURE — 99214 OFFICE O/P EST MOD 30 MIN: CPT | Performed by: INTERNAL MEDICINE

## 2025-01-22 PROCEDURE — 1159F MED LIST DOCD IN RCRD: CPT | Performed by: INTERNAL MEDICINE

## 2025-01-22 PROCEDURE — 1123F ACP DISCUSS/DSCN MKR DOCD: CPT | Performed by: INTERNAL MEDICINE

## 2025-01-22 RX ORDER — TORSEMIDE 20 MG/1
20 TABLET ORAL 2 TIMES DAILY
Qty: 30 TABLET | Refills: 1
Start: 2025-01-22

## 2025-01-22 RX ORDER — FERROUS SULFATE 325(65) MG
325 TABLET ORAL
COMMUNITY

## 2025-03-24 NOTE — PROGRESS NOTES
[facility]   Cardiac Evaluation      Patient: Ami Sheikh  YOB: 1931  Date: 3/25/25     Chief Complaint   Patient presents with    Hypertension    Hyperlipidemia          Referring provider: Walter Espinosa MD    History of Present Illness:   Mrs. Sheikh has a history that includes CAD w/ CABG and repair of ventricular septal aneurysm and rupture in 1988. She had repeat LHC in 2007 that revealed patent grafts. She is on Coumadin for atrial fibrillation. She has a known cmp with moderate valvular disease and RVSP of 38. She has had complaints of dry mouth and dizziness in the past.      She fell early June and was hospitalized at [facility] 6/5-6/12/23. She suffered left elbow and hip fracture. She underwent open reduction internal fixation of olecranon. Orthopedic surgery recommended conservative management of left hip. She was discharged to SNF for rehab. On 7/1/23 she was taken to [facility] by EMS for expressive aphasia at [facility]. Non contrast head CT and CTA head/neck were negative for hemorrhage, acute stroke or large vessel occlusion. She was discharged back to SNF     Ami fell at home on 5/17/24 and was hospitalized at [facility] and transferred to [place].  Head CT 5/17/24 showed thin subdural hematoma at lateral frontotemporal region not exceeding 3mm in thickness. Eliquis was put on hold. She had a repeat head CT on 5/28/24 that stated the prior small right subdural hematoma resolved in the interval. She was placed back on Eliquis 2.5mg BID. She went back to the hospital on 6/7/24 w/ HA, photosensitivity, and nausea x1 week. MRI done .6/7/24 revealed small acute subdural hematoma overlying the inferior anterior right frontal lobe 3-4mm. She was advised to stop Eliquis and see her cardiologist to discuss Watchman procedure. She has since followed up with neurology and is taking Eliquis 2.5mg BID.      She was hospitalized 7/28-8/3/4 with dysarthria and right sided weakness.

## 2025-03-25 ENCOUNTER — OFFICE VISIT (OUTPATIENT)
Dept: CARDIOLOGY CLINIC | Age: 89
End: 2025-03-25
Payer: MEDICARE

## 2025-03-25 VITALS
OXYGEN SATURATION: 99 % | HEIGHT: 65 IN | SYSTOLIC BLOOD PRESSURE: 120 MMHG | BODY MASS INDEX: 22.53 KG/M2 | WEIGHT: 135.2 LBS | DIASTOLIC BLOOD PRESSURE: 70 MMHG | HEART RATE: 52 BPM

## 2025-03-25 DIAGNOSIS — E78.5 HYPERLIPIDEMIA, UNSPECIFIED HYPERLIPIDEMIA TYPE: ICD-10-CM

## 2025-03-25 DIAGNOSIS — I25.10 CORONARY ARTERY DISEASE INVOLVING NATIVE CORONARY ARTERY OF NATIVE HEART WITHOUT ANGINA PECTORIS: Primary | ICD-10-CM

## 2025-03-25 DIAGNOSIS — I48.20 CHRONIC ATRIAL FIBRILLATION (HCC): ICD-10-CM

## 2025-03-25 DIAGNOSIS — I10 ESSENTIAL HYPERTENSION: ICD-10-CM

## 2025-03-25 PROCEDURE — 99214 OFFICE O/P EST MOD 30 MIN: CPT | Performed by: INTERNAL MEDICINE

## 2025-03-25 PROCEDURE — 1123F ACP DISCUSS/DSCN MKR DOCD: CPT | Performed by: INTERNAL MEDICINE

## 2025-03-25 PROCEDURE — G2211 COMPLEX E/M VISIT ADD ON: HCPCS | Performed by: INTERNAL MEDICINE

## 2025-03-25 PROCEDURE — 1159F MED LIST DOCD IN RCRD: CPT | Performed by: INTERNAL MEDICINE

## 2025-03-31 ENCOUNTER — TELEPHONE (OUTPATIENT)
Dept: CARDIOLOGY CLINIC | Age: 89
End: 2025-03-31

## 2025-03-31 NOTE — TELEPHONE ENCOUNTER
I called and spoke w/ Diana. She was aware that Ami saw Dr Kay last week and wanted to check to see if any changes should be made in her Torsemide dose. She states Ami's weight is up 8 lbs in approx 10 days. Ami does not have any SOB, edema and did not think the dose needed for be increased.   Per Dr Kay, no changes at this time. Continue to monitor pt. I relayed instructions to Diana.

## 2025-03-31 NOTE — TELEPHONE ENCOUNTER
NH states pt has had weight gain over last couple weeks  of over 5 lbs. On Sat weight  was 144.6.  BP today  153/87 HR 67 (a little increase.)  No sob, swelling or chest pain. Please call to advise.

## 2025-05-13 NOTE — PROGRESS NOTES
estimated to be 45-50%.   Borderline concentric left ventricular hypertrophy. Inferoseptum wall is hypokinetic.   Left atrium is severely dilated. Right atrium is severely dilated. Mild mitral annular calcification. Mild mitral regurgitation. Mild mitral valve prolapse. Prolapse of the posterior mitral leaflet(s). Aortic valve leaflets appear sclerotic. Mild to moderate aortic regurgitation. Mild - moderate tricuspid regurgitation is present. Mild pulmonic valvular regurgitation. Injection of agitated saline showed no interatrial shunt. Atrial flutter with a ventricular rate of 65 beats per minute.     -Echo 5/27/22> Normal left ventricular size. Mild concentric left ventricular hypertrophy. Decreased left ventricular systolic function with mid to distal inferoseptal and apical hypokinesis. No evidence of apical clots. Contrast unavailable. History of ventricular septum aneurysm and rupture repair. No evidence of shunting. Estimated EF 35-40% E/e'=13. Diastolic dysfunction.  Mild mitral valve prolapse. Moderate mitral regurgitation. The left atrium is severely dilated. The aortic valve appears tricuspid with mild sclerosis no stenosis.  Moderate aortic regurgitation is present. Pressure half-time is calculated at 559 msec. The right ventricle is mildly enlarged and mildly decreased in function.  Severe tricuspid regurgitation. RVSP 38mmHg. The right atrium is severely dilated.  Mild pulmonic regurgitation. IVC size is normal (<2.1 cm) but collapses < 50% with respiration consistent with elevated RA pressure (8 mmHg).    -Echo 12/11/18> Mild cLVH. Mildly decreased left ventricular systolic function with distal inferoseptal and apical akinesis. No evidence of clot or shunt. Hx of ventricular septum aneurysm and rupture repair. EF 40-45%.Mitral valve sclerosis with mild posterior mitral valve prolapse. Moderate mitral regurgitation with no pulmonary vein reversal. The left atrium is dilated. The aortic valve appears

## 2025-05-21 ENCOUNTER — OFFICE VISIT (OUTPATIENT)
Dept: CARDIOLOGY CLINIC | Age: 89
End: 2025-05-21
Payer: MEDICARE

## 2025-05-21 VITALS — OXYGEN SATURATION: 94 % | HEART RATE: 52 BPM | DIASTOLIC BLOOD PRESSURE: 54 MMHG | SYSTOLIC BLOOD PRESSURE: 106 MMHG

## 2025-05-21 DIAGNOSIS — I48.20 CHRONIC ATRIAL FIBRILLATION (HCC): ICD-10-CM

## 2025-05-21 DIAGNOSIS — I25.5 ISCHEMIC CARDIOMYOPATHY: ICD-10-CM

## 2025-05-21 DIAGNOSIS — I50.42 SYSTOLIC AND DIASTOLIC CHF, CHRONIC (HCC): ICD-10-CM

## 2025-05-21 DIAGNOSIS — I10 ESSENTIAL HYPERTENSION: ICD-10-CM

## 2025-05-21 DIAGNOSIS — E78.5 HYPERLIPIDEMIA, UNSPECIFIED HYPERLIPIDEMIA TYPE: ICD-10-CM

## 2025-05-21 DIAGNOSIS — I25.10 CORONARY ARTERY DISEASE INVOLVING NATIVE CORONARY ARTERY OF NATIVE HEART WITHOUT ANGINA PECTORIS: Primary | ICD-10-CM

## 2025-05-21 PROCEDURE — G2211 COMPLEX E/M VISIT ADD ON: HCPCS | Performed by: INTERNAL MEDICINE

## 2025-05-21 PROCEDURE — 1159F MED LIST DOCD IN RCRD: CPT | Performed by: INTERNAL MEDICINE

## 2025-05-21 PROCEDURE — 99214 OFFICE O/P EST MOD 30 MIN: CPT | Performed by: INTERNAL MEDICINE

## 2025-05-21 PROCEDURE — 1123F ACP DISCUSS/DSCN MKR DOCD: CPT | Performed by: INTERNAL MEDICINE

## 2025-05-21 RX ORDER — DIPHENHYDRAMINE HCL 25 MG
12.5 CAPSULE ORAL NIGHTLY PRN
COMMUNITY

## 2025-05-21 RX ORDER — POLYETHYLENE GLYCOL 3350 17 G/17G
17 POWDER, FOR SOLUTION ORAL EVERY OTHER DAY
COMMUNITY
Start: 2024-11-29

## 2025-07-14 NOTE — PROGRESS NOTES
[facility]   Cardiac Evaluation      Patient: Ami Sheikh  YOB: 1931  Date: 7/18/25     Chief Complaint   Patient presents with    Atrial Fibrillation    Coronary Artery Disease    Hyperlipidemia          Referring provider: Walter Espinosa MD    History of Present Illness:   Mrs. Sheikh has a history that includes CAD w/ CABG and repair of ventricular septal aneurysm and rupture in 1988. She had repeat LHC in 2007 that revealed patent grafts. She is on Coumadin for atrial fibrillation. She has a known cmp with moderate valvular disease and RVSP of 38. She has had complaints of dry mouth and dizziness in the past.      She fell early June and was hospitalized at [facility] 6/5-6/12/23. She suffered left elbow and hip fracture. She underwent open reduction internal fixation of olecranon. Orthopedic surgery recommended conservative management of left hip. She was discharged to [facility] for rehab. On 7/1/23 she was taken to [facility] by EMS for expressive aphasia at [facility]. Non contrast head CT and CTA head/neck were negative for hemorrhage, acute stroke or large vessel occlusion. She was discharged back to SNF     Ami fell at home on 5/17/24 and was hospitalized at [facility] and transferred to [place].  Head CT 5/17/24 showed thin subdural hematoma at lateral frontotemporal region not exceeding 3mm in thickness. Eliquis was put on hold. She had a repeat head CT on 5/28/24 that stated the prior small right subdural hematoma resolved in the interval. She was placed back on Eliquis 2.5mg BID. She went back to the hospital on 6/7/24 w/ HA, photosensitivity, and nausea x1 week. MRI done .6/7/24 revealed small acute subdural hematoma overlying the inferior anterior right frontal lobe 3-4mm. She was advised to stop Eliquis and see her cardiologist to discuss Watchman procedure. She has since followed up with neurology and is taking Eliquis 2.5mg BID.      She was hospitalized 7/28-8/3/24 with

## 2025-07-18 ENCOUNTER — OFFICE VISIT (OUTPATIENT)
Dept: CARDIOLOGY CLINIC | Age: 89
End: 2025-07-18
Payer: MEDICARE

## 2025-07-18 VITALS
HEIGHT: 65 IN | WEIGHT: 138.4 LBS | DIASTOLIC BLOOD PRESSURE: 70 MMHG | BODY MASS INDEX: 23.06 KG/M2 | OXYGEN SATURATION: 95 % | HEART RATE: 48 BPM | SYSTOLIC BLOOD PRESSURE: 118 MMHG

## 2025-07-18 DIAGNOSIS — I25.10 CORONARY ARTERY DISEASE INVOLVING NATIVE CORONARY ARTERY OF NATIVE HEART WITHOUT ANGINA PECTORIS: Primary | ICD-10-CM

## 2025-07-18 DIAGNOSIS — I10 ESSENTIAL HYPERTENSION: ICD-10-CM

## 2025-07-18 DIAGNOSIS — I50.42 SYSTOLIC AND DIASTOLIC CHF, CHRONIC (HCC): ICD-10-CM

## 2025-07-18 DIAGNOSIS — I48.20 CHRONIC ATRIAL FIBRILLATION (HCC): ICD-10-CM

## 2025-07-18 DIAGNOSIS — E78.5 HYPERLIPIDEMIA, UNSPECIFIED HYPERLIPIDEMIA TYPE: ICD-10-CM

## 2025-07-18 PROCEDURE — 99214 OFFICE O/P EST MOD 30 MIN: CPT | Performed by: INTERNAL MEDICINE

## 2025-07-18 PROCEDURE — 1123F ACP DISCUSS/DSCN MKR DOCD: CPT | Performed by: INTERNAL MEDICINE

## 2025-07-18 PROCEDURE — G2211 COMPLEX E/M VISIT ADD ON: HCPCS | Performed by: INTERNAL MEDICINE

## 2025-07-18 PROCEDURE — 1159F MED LIST DOCD IN RCRD: CPT | Performed by: INTERNAL MEDICINE

## 2025-07-18 RX ORDER — LEVETIRACETAM 500 MG/1
500 TABLET ORAL 2 TIMES DAILY
COMMUNITY